# Patient Record
Sex: MALE | Race: WHITE
[De-identification: names, ages, dates, MRNs, and addresses within clinical notes are randomized per-mention and may not be internally consistent; named-entity substitution may affect disease eponyms.]

---

## 2018-08-28 ENCOUNTER — HOSPITAL ENCOUNTER (OUTPATIENT)
Age: 73
End: 2018-08-28
Payer: MEDICARE

## 2018-08-28 DIAGNOSIS — D70.9: Primary | ICD-10-CM

## 2018-08-28 LAB
ADD MANUAL DIFF / SLIDE REVIEW: NO
ALBUMIN SERPL-MCNC: 3.8 G/DL (ref 3.5–5)
ALBUMIN/GLOB SERPL: 1.5 {RATIO} (ref 1–2.8)
ALP SERPL-CCNC: 82 U/L (ref 38–126)
ALT SERPL-CCNC: 65 IU/L (ref 21–72)
BUN SERPL-MCNC: 16 MG/DL (ref 9–20)
CALCIUM SERPL-MCNC: 9.3 MG/DL (ref 8.4–10.2)
CHLORIDE SERPL-SCNC: 107 MMOL/L (ref 98–107)
CO2 SERPL-SCNC: 24 MMOL/L (ref 22–32)
ESTIMATED GLOMERULAR FILT RATE: > 60 ML/MIN (ref 60–?)
GLOBULIN SER CALC-MCNC: 2.6 G/DL (ref 1.7–4.1)
GLUCOSE SERPL-MCNC: 198 MG/DL (ref 80–110)
HEMATOCRIT: 39.8 % (ref 41–53)
HEMOGLOBIN: 13.8 G/DL (ref 13.5–17.5)
HEMOLYSIS: < 15 (ref 0–50)
MCV RBC: 88.2 FL (ref 80–100)
MEAN CORPUSCULAR HEMOGLOBIN: 30.6 PG (ref 26–34)
MEAN CORPUSCULAR HGB CONC: 34.6 % (ref 30–36)
PLATELET COUNT: 56 X10^3/UL (ref 150–400)
POTASSIUM SERPL-SCNC: 4.1 MMOL/L (ref 3.4–5.1)
PROT SERPL-MCNC: 6.4 G/DL (ref 6.3–8.2)
SODIUM SERPL-SCNC: 142 MMOL/L (ref 137–145)

## 2018-08-28 PROCEDURE — 36415 COLL VENOUS BLD VENIPUNCTURE: CPT

## 2018-08-28 PROCEDURE — 80053 COMPREHEN METABOLIC PANEL: CPT

## 2018-08-28 PROCEDURE — 85025 COMPLETE CBC W/AUTO DIFF WBC: CPT

## 2018-09-12 ENCOUNTER — HOSPITAL ENCOUNTER (OUTPATIENT)
Age: 73
End: 2018-09-12
Payer: MEDICARE

## 2018-09-12 DIAGNOSIS — D70.9: ICD-10-CM

## 2018-09-12 DIAGNOSIS — D69.6: ICD-10-CM

## 2018-09-12 DIAGNOSIS — R16.1: Primary | ICD-10-CM

## 2018-09-12 PROCEDURE — 76705 ECHO EXAM OF ABDOMEN: CPT

## 2018-09-12 NOTE — DI.US.S_ITS
PROCEDURE: US ABDOMEN LIMITED  
   
INDICATIONS:  Splenomegaly  
   
TECHNIQUE:    
Real-time focused scanning was performed of the abdomen, with image documentation.    
   
COMPARISON:  [facility], CT, NECK/CHEST/ABD/PEL W CONTRAST, 1/13/2016, 10:06.    
[facility], US, ABDOMEN LIMITED, 7/06/2016, 9:07.  
   
FINDINGS: The spleen is enlarged measuring 17.7 cm in greatest length, with additional   
dimensions of 8.2 and 17 cm, with a total calculated volume of 1296 cubic centimeters.    
In 2016, it measured 13.6 cm in length and had a calculated volume of 513 cc.  
   
   
   
IMPRESSION: Enlarged spleen, which has increased in size compared to 2016.    
   
Dictated by: Ravi Crespo M.D. on 9/12/2018 at 8:58       
Approved by: Ravi Crespo M.D. on 9/12/2018 at 9:01

## 2019-12-10 ENCOUNTER — HOSPITAL ENCOUNTER (OUTPATIENT)
Age: 74
End: 2019-12-10
Payer: MEDICARE

## 2019-12-10 DIAGNOSIS — R05: ICD-10-CM

## 2019-12-10 DIAGNOSIS — R18.8: ICD-10-CM

## 2019-12-10 DIAGNOSIS — J90: Primary | ICD-10-CM

## 2019-12-10 LAB
ADD MANUAL DIFF / SLIDE REVIEW: NO
ALBUMIN SERPL-MCNC: 3.5 G/DL (ref 3.5–5)
ALBUMIN/GLOB SERPL: 1.3 {RATIO} (ref 1–2.8)
ALP SERPL-CCNC: 139 U/L (ref 38–126)
ALT SERPL-CCNC: 30 IU/L (ref ?–50)
BUN SERPL-MCNC: 16 MG/DL (ref 9–20)
CALCIUM SERPL-MCNC: 8.7 MG/DL (ref 8.4–10.2)
CHLORIDE SERPL-SCNC: 104 MMOL/L (ref 98–107)
CO2 SERPL-SCNC: 27 MMOL/L (ref 22–32)
ESTIMATED GLOMERULAR FILT RATE: > 60 ML/MIN (ref 60–?)
GLOBULIN SER CALC-MCNC: 2.8 G/DL (ref 1.7–4.1)
GLUCOSE SERPL-MCNC: 146 MG/DL (ref 80–110)
HEMATOCRIT: 39.4 % (ref 41–53)
HEMOGLOBIN: 13.4 G/DL (ref 13.5–17.5)
HEMOLYSIS: < 15 (ref 0–50)
LYMPHOCYTES # SPEC AUTO: 800 /UL (ref 1100–4500)
MCV RBC: 84.3 FL (ref 80–100)
MEAN CORPUSCULAR HEMOGLOBIN: 28.7 PG (ref 26–34)
MEAN CORPUSCULAR HGB CONC: 34 % (ref 30–36)
PLATELET COUNT: 82 X10^3/UL (ref 150–400)
POTASSIUM SERPL-SCNC: 3.6 MMOL/L (ref 3.4–5.1)
PROT SERPL-MCNC: 6.3 G/DL (ref 6.3–8.2)
SODIUM SERPL-SCNC: 138 MMOL/L (ref 137–145)

## 2019-12-10 PROCEDURE — 82248 BILIRUBIN DIRECT: CPT

## 2019-12-10 PROCEDURE — 85025 COMPLETE CBC W/AUTO DIFF WBC: CPT

## 2019-12-10 PROCEDURE — 36415 COLL VENOUS BLD VENIPUNCTURE: CPT

## 2019-12-10 PROCEDURE — 80053 COMPREHEN METABOLIC PANEL: CPT

## 2019-12-10 PROCEDURE — 71046 X-RAY EXAM CHEST 2 VIEWS: CPT

## 2019-12-10 NOTE — DI.RAD.S_ITS
PROCEDURE:  XR CHEST 2V  
   
INDICATIONS:  PLEURAL EFFUSION  
   
TECHNIQUE:  2 views of the chest were acquired.    
   
COMPARISON:  [facility], CT, ABDOMEN/PELVIS WITH CONTRAST, 4/18/2015, 15:36.  
   
FINDINGS:    
   
Surgical changes and devices:  None.    
   
Lungs and pleura: There are small bilateral pleural effusions and bibasilar compression   
atelectasis.  No pneumothorax.    
   
Mediastinum:  Mediastinal contours are normal.  Heart size is normal.    
   
Bones and chest wall:  No suspicious bony abnormalities.  Soft tissues appear   
unremarkable.    
   
IMPRESSION: Small bilateral pleural effusions and bibasilar compression atelectasis.  
   
   
   
Dictated by: Arabella Myrick M.D. on 12/10/2019 at 16:45       
Approved by: Arabella Myrick M.D. on 12/10/2019 at 16:46

## 2019-12-12 ENCOUNTER — HOSPITAL ENCOUNTER (OUTPATIENT)
Age: 74
End: 2019-12-12
Payer: MEDICARE

## 2019-12-12 ENCOUNTER — HOSPITAL ENCOUNTER (EMERGENCY)
Age: 74
Discharge: HOME | End: 2019-12-12
Payer: MEDICARE

## 2019-12-12 VITALS
SYSTOLIC BLOOD PRESSURE: 178 MMHG | RESPIRATION RATE: 15 BRPM | DIASTOLIC BLOOD PRESSURE: 87 MMHG | HEART RATE: 85 BPM | OXYGEN SATURATION: 96 % | TEMPERATURE: 97.7 F

## 2019-12-12 VITALS
SYSTOLIC BLOOD PRESSURE: 147 MMHG | RESPIRATION RATE: 16 BRPM | OXYGEN SATURATION: 94 % | DIASTOLIC BLOOD PRESSURE: 73 MMHG | HEART RATE: 73 BPM

## 2019-12-12 VITALS — BODY MASS INDEX: 26.7 KG/M2

## 2019-12-12 VITALS
RESPIRATION RATE: 19 BRPM | HEART RATE: 84 BPM | OXYGEN SATURATION: 95 % | DIASTOLIC BLOOD PRESSURE: 69 MMHG | SYSTOLIC BLOOD PRESSURE: 172 MMHG

## 2019-12-12 VITALS
RESPIRATION RATE: 16 BRPM | SYSTOLIC BLOOD PRESSURE: 163 MMHG | DIASTOLIC BLOOD PRESSURE: 76 MMHG | OXYGEN SATURATION: 94 % | HEART RATE: 71 BPM

## 2019-12-12 VITALS
OXYGEN SATURATION: 96 % | SYSTOLIC BLOOD PRESSURE: 172 MMHG | HEART RATE: 83 BPM | DIASTOLIC BLOOD PRESSURE: 64 MMHG | RESPIRATION RATE: 18 BRPM

## 2019-12-12 DIAGNOSIS — R18.8: Primary | ICD-10-CM

## 2019-12-12 DIAGNOSIS — R16.1: ICD-10-CM

## 2019-12-12 DIAGNOSIS — K74.60: ICD-10-CM

## 2019-12-12 DIAGNOSIS — K80.20: ICD-10-CM

## 2019-12-12 LAB
ADD MANUAL DIFF / SLIDE REVIEW: NO
ALBUMIN SERPL-MCNC: 3.7 G/DL (ref 3.5–5)
ALBUMIN/GLOB SERPL: 1.2 {RATIO} (ref 1–2.8)
ALP SERPL-CCNC: 144 U/L (ref 38–126)
ALT SERPL-CCNC: 32 IU/L (ref ?–50)
BODY FLUID CLOTTED?: (no result)
BODY FLUID TOT NUCLEATED CELLS: 578 /UL
BUN SERPL-MCNC: 20 MG/DL (ref 9–20)
CALCIUM SERPL-MCNC: 8.9 MG/DL (ref 8.4–10.2)
CHLORIDE SERPL-SCNC: 101 MMOL/L (ref 98–107)
CO2 SERPL-SCNC: 27 MMOL/L (ref 22–32)
EOSINOPHILS BODY FLUID: 0 %
ESTIMATED GLOMERULAR FILT RATE: > 60 ML/MIN (ref 60–?)
GLOBULIN SER CALC-MCNC: 3 G/DL (ref 1.7–4.1)
GLUCOSE SERPL-MCNC: 106 MG/DL (ref 80–110)
HEMATOCRIT: 40.9 % (ref 41–53)
HEMOGLOBIN: 13.9 G/DL (ref 13.5–17.5)
HEMOLYSIS: 34 (ref 0–50)
INR PPP: 1.3 (ref 0.9–1.3)
LIPASE SERPL-CCNC: 189 U/L (ref 23–300)
LYMPHOCYTES # SPEC AUTO: 800 /UL (ref 1100–4500)
MCV RBC: 84.1 FL (ref 80–100)
MEAN CORPUSCULAR HEMOGLOBIN: 28.6 PG (ref 26–34)
MEAN CORPUSCULAR HGB CONC: 34 % (ref 30–36)
MONONUCLEAR WBC BODY FLUID: 88 %
OTHER CELLS BODY FLUID: 4 %
PLATELET COUNT: 84 X10^3/UL (ref 150–400)
POLYNUCLEAR WBC BODY FLUID: 8 %
POTASSIUM SERPL-SCNC: 3.5 MMOL/L (ref 3.4–5.1)
PROT SERPL-MCNC: 6.7 G/DL (ref 6.3–8.2)
PROTHROMBIN TIME: 14.4 SECONDS (ref 10.1–12.7)
PTT PARTIAL THROMBOPLASTIN TIM: 32 SECONDS (ref 26.4–36.2)
SODIUM SERPL-SCNC: 135 MMOL/L (ref 137–145)

## 2019-12-12 PROCEDURE — 85025 COMPLETE CBC W/AUTO DIFF WBC: CPT

## 2019-12-12 PROCEDURE — 36415 COLL VENOUS BLD VENIPUNCTURE: CPT

## 2019-12-12 PROCEDURE — 49083 ABD PARACENTESIS W/IMAGING: CPT

## 2019-12-12 PROCEDURE — 74177 CT ABD & PELVIS W/CONTRAST: CPT

## 2019-12-12 PROCEDURE — 82945 GLUCOSE OTHER FLUID: CPT

## 2019-12-12 PROCEDURE — 86900 BLOOD TYPING SEROLOGIC ABO: CPT

## 2019-12-12 PROCEDURE — 83690 ASSAY OF LIPASE: CPT

## 2019-12-12 PROCEDURE — 85610 PROTHROMBIN TIME: CPT

## 2019-12-12 PROCEDURE — 82150 ASSAY OF AMYLASE: CPT

## 2019-12-12 PROCEDURE — 83615 LACTATE (LD) (LDH) ENZYME: CPT

## 2019-12-12 PROCEDURE — 99284 EMERGENCY DEPT VISIT MOD MDM: CPT

## 2019-12-12 PROCEDURE — 86901 BLOOD TYPING SEROLOGIC RH(D): CPT

## 2019-12-12 PROCEDURE — 84157 ASSAY OF PROTEIN OTHER: CPT

## 2019-12-12 PROCEDURE — 89051 BODY FLUID CELL COUNT: CPT

## 2019-12-12 PROCEDURE — 85730 THROMBOPLASTIN TIME PARTIAL: CPT

## 2019-12-12 PROCEDURE — 80053 COMPREHEN METABOLIC PANEL: CPT

## 2019-12-12 PROCEDURE — 86850 RBC ANTIBODY SCREEN: CPT

## 2019-12-12 NOTE — DI.CT.S_ITS
PROCEDURE:  CT ABDOMEN PELVIS W CON  
   
INDICATIONS:  Other ascites  
   
TECHNIQUE:    
After the administration of oral and intravenous contrast, 5 mm thick sections acquired   
from the diaphragms to the symphysis.  5 mm thick coronal and sagittal reformats were   
performed.  For radiation dose reduction, the following was used:  automated exposure   
control, adjustment of mA and/or kV according to patient size.    
   
COMPARISON:  [facility], US, US ABDOMEN LIMITED, 9/12/2018, 9:36.  [facility],   
US, ABDOMEN LIMITED, 7/06/2016, 9:07.  [facility], CT, NECK/CHEST/ABD/PEL W   
CONTRAST, 1/13/2016, 10:06.  [facility], CT, ABDOMEN/PELVIS WITH CONTRAST,   
4/18/2015, 15:36.  
   
FINDINGS:    
Image quality:  Excellent.    
   
ABDOMEN:    
Lung bases: Moderate pleural effusions bilaterally.  Bibasilar compression atelectasis.   
Heart size is normal.  There is a small hiatal hernia.  
   
Solid organs:  Liver is small and nodular in contour consistent with cirrhosis.    
Gallbladder contains gallstones.  Biliary system is non-dilated.  Pancreas enhances   
normally.  Spleen is enlarged.  There is a 1.9 cm low density mass in spleen, which   
appears unchanged in size compared to 4/18/2015. Tiny low density nodules are noted in   
spleen, not definitely seen on the last exam. No adrenal nodules.  Kidneys are normal in   
size and enhancement, without hydronephrosis.    
   
Peritoneum and bowel:  There is a large amount of ascites. Jejunal loops and colon loops   
demonstrate increased wall thickness.  There are numerous colonic diverticula in   
descending and sigmoid colon. No CT findings to suggest acute diverticulitis. No free   
air.    
   
Nodes and vessels:  No retroperitoneal or mesenteric adenopathy.  Aorta and inferior vena   
cava are normal in caliber.  Recannulized umbilical vein consistent portal hypertension.  
   
Miscellaneous:  No ventral hernias.    
   
   
PELVIS:    
Genitourinary:  Bladder wall thickness is normal.  Enlarged prostate.  
   
Miscellaneous:  No inguinal hernias or adenopathy.    
   
Bones:  No suspicious bony lesions.  No vertebral body compression fractures.    
   
IMPRESSION:  
   
1. Cirrhosis.  
   
2. Splenomegaly which may be secondary to portal hypertension.  
   
3. Stable 1.9 cm mass in the spleen. Several small hypodense nodules in spleen are   
indeterminate.  
   
4. A large amount of ascites.  
   
5. There is diffuse jejunal and colonic wall thickening. Differential diagnoses include   
hypoalbuminemia versus infectious etiology.  
   
6. Diverticulosis without diverticulitis.   
   
7. Moderate bilateral pleural effusions and bibasilar compression atelectasis.  
   
   
8. Cholelithiasis.   
   
9. Enlarged prostate.  
   
After discussing with the patient, the patient was referred to ER. The result was   
discussed with Dr. Edwards in ER.  
   
Dictated by: Arabella Myrick M.D. on 12/12/2019 at 15:00       
Approved by: Arabella Myrick M.D. on 12/12/2019 at 15:24

## 2019-12-12 NOTE — ED.ABDPAIN
"HPI - Abdominal Pain
<Petrona Edwards DO - Last Filed: 12/12/19 19:01>
General
Chief Complaint: Abdominal Pain
Stated Complaint: sent post CT scan
Time Seen by Provider: 12/12/19 15:18
Source: patient
Mode of arrival: Ambulatory
Limitations: no limitations
History of Present Illness
HPI narrative: This is a 74-year-old male who comes to the emergency department with complaint of swelling in his abdomen.  Patient states when he lays flat he feels like there's lot of pressure and shortness of breath in his chest.  Patient states 
that if he is upright he does not have much symptoms.  Patient denies any fevers.  He states he usually doesn't feel nauseated he had a little bit this afternoon while getting a CT scan.  Patient states his abdomen has been increasing in size over 
time.  They state that they've noted that it's very hard.  He has had some loose stools for the past month.  He states that they're back to normal as of this week.  He denies any new urinary symptoms.  He gets mild swelling in his lower extremities. 
 Patient's states he was told he had fatty liver about 20 years ago.  He states that some his primary care ordered some lab work to evaluate increasing swelling in his belly.  And he is scheduled for an echo later in the month.  Patient states he 
has never had a blood transfusion, he denies IV drug abuse, no history of hepatitis he is aware of.  He denies any alcohol use except for occasional.
Related Data
Home Medications

 Medication  Instructions  Recorded  Confirmed
tamsulosin [Flomax] 0.4 mg PO QDAY #0 05/01/12 08/31/18
hydrochlorothiazide 6.26 mg PO QDAY #0 09/30/16 08/31/18
magnesium 200 mg PO DAILY #0 12/13/16 08/31/18


Allergies

Allergy/AdvReac Type Severity Reaction Status Date / Time
No Known Drug Allergies Allergy   Verified 12/12/19 15:15



Review of Systems
<Petrona Edwards DO - Last Filed: 12/12/19 19:01>
Review of Systems
ROS Unobtainable: All systems reviewed & are unremarkable except as noted in HPI and below

Patient History
<Petrona Edwards DO - Last Filed: 12/12/19 19:01>
Social History
Smoking Status:  Unknown if ever smoked 


Smoking Status: Unknown if ever smoked
alcohol intake frequency: other
Substance Use Type: does not use

Exam
<Petrona Edwards DO - Last Filed: 12/12/19 19:01>
Narrative
Exam Narrative: GENERAL: Alert and oriented x three, well-appearing male in mild distress.  Patient does appear much more comfortable when sitting upright.  
HEENT: Head normocephalic, atraumatic, EOMI, pupils reactive, face symmetric, moist mucous membranes
NECK: Supple, full range of motion
CARDIOVASCULAR: Regular rate and rhythm without murmurs, rubs or gallops.
RESPIRATORY: Breath sounds equal bilaterally, no wheezes rales or rhonchi.  Patient has mild cough on exam.
ABDOMEN: Soft, distended, abdomen feels tight but not hard.  No caput.  Normoactive bowel sounds all 4 quadrants.  No guarding or rebound, rigidity, no mass.  
: No CVA tenderness
EXTREMITIES: Normal range of motion, trace lower extremity edema.  Neurovascularly intact
NEUROLOGICAL: Cranial nerves II through XII grossly intact.  Moving all extremities
SKIN: Warm, dry, no petechiae, no rashes or lesions.
Initial Vital Signs
Initial Vital Signs:  Vital Signs

Temperature  97.7 F   12/12/19 15:15
Pulse Rate  85   12/12/19 15:15
Respiratory Rate  15   12/12/19 15:15
Blood Pressure  178/87 H  12/12/19 15:15
Pulse Oximetry  96   12/12/19 15:15



<Lily Villalpando PA-C - Last Filed: 12/12/19 17:27>
Initial Vital Signs
Initial Vital Signs:  Vital Signs

Temperature  97.7 F   12/12/19 15:15
Pulse Rate  85   12/12/19 15:15
Respiratory Rate  15   12/12/19 15:15
Blood Pressure  178/87 H  12/12/19 15:15
Pulse Oximetry  96   12/12/19 15:15



Course
<Petrona Edwards DO - Last Filed: 12/12/19 19:01>
Orders
Ordered:  ED Orders

12/12/19 15:18
Complete Blood Count AUTO DIFF Stat 
Comprehensive Metabolic Panel Stat 
Lipase Stat 
Partial Thromboplastin Time Sta
[id number]|2019-12-12 18:49:00|2019-12-12 18:49:00|ED_ITS|[facility]|Emergency Department|[id number]|"HPI - Nausea/Vomiting/Diarrhea

## 2019-12-12 NOTE — DI.US.S_ITS
PROCEDURE:  US PARACENTESIS  
   
INDICATIONS:  ascites, unknown cause. diagnostic and therapeutic  
   
TECHNIQUE:    
The indications, alternatives, benefits, risks, and complications of the procedure were   
explained to the patient.  Written informed consent was obtained and placed in the chart.   
 The abdomen and pelvis were examined sonographically, and an appropriate site was chosen   
for paracentesis.  The skin was prepared and draped in the usual sterile fashion, and 1%   
lidocaine was infiltrated from the skin down through the peritoneal surface.  A 19-gauge   
catheter-covered needle was then introduced into the peritoneal space, the catheter was   
advanced and the needle was withdrawn, and thereafter peritoneal fluid was withdrawn.    
The catheter was then removed and a dressing was applied.  The fluid was discarded if the   
clinician did not order diagnostic testing of the fluid.    
   
COMPARISON:  None.  
   
FINDINGS:    
Access site: Right lower quadrant  
Needle:  One-Step centesis catheter with introducer needle.    
Fluid volume and description:  5000 mL, slightly cloudy  
Fluid sent for diagnostic testing: Per Dr. Edwards  
Medications:  1% lidocaine for local anaesthesia.    
Complications:  None.    
   
IMPRESSION:  Successful ultrasound-guided paracentesis.    
   
   
   
Dictated by: Arabella Myrick M.D. on 12/12/2019 at 18:07       
Approved by: Arabella Myrick M.D. on 12/12/2019 at 18:07

## 2019-12-18 ENCOUNTER — HOSPITAL ENCOUNTER (EMERGENCY)
Age: 74
Discharge: HOME | End: 2019-12-18
Payer: MEDICARE

## 2019-12-18 VITALS
OXYGEN SATURATION: 98 % | SYSTOLIC BLOOD PRESSURE: 147 MMHG | HEART RATE: 73 BPM | RESPIRATION RATE: 17 BRPM | DIASTOLIC BLOOD PRESSURE: 64 MMHG

## 2019-12-18 VITALS
HEART RATE: 71 BPM | RESPIRATION RATE: 17 BRPM | DIASTOLIC BLOOD PRESSURE: 65 MMHG | OXYGEN SATURATION: 90 % | SYSTOLIC BLOOD PRESSURE: 134 MMHG

## 2019-12-18 VITALS
DIASTOLIC BLOOD PRESSURE: 76 MMHG | OXYGEN SATURATION: 91 % | SYSTOLIC BLOOD PRESSURE: 163 MMHG | HEART RATE: 73 BPM | RESPIRATION RATE: 16 BRPM

## 2019-12-18 VITALS
RESPIRATION RATE: 19 BRPM | OXYGEN SATURATION: 95 % | DIASTOLIC BLOOD PRESSURE: 71 MMHG | HEART RATE: 71 BPM | SYSTOLIC BLOOD PRESSURE: 156 MMHG

## 2019-12-18 VITALS
SYSTOLIC BLOOD PRESSURE: 160 MMHG | RESPIRATION RATE: 18 BRPM | HEART RATE: 75 BPM | DIASTOLIC BLOOD PRESSURE: 73 MMHG | OXYGEN SATURATION: 90 %

## 2019-12-18 VITALS
HEART RATE: 68 BPM | TEMPERATURE: 97.88 F | DIASTOLIC BLOOD PRESSURE: 72 MMHG | SYSTOLIC BLOOD PRESSURE: 142 MMHG | OXYGEN SATURATION: 94 % | RESPIRATION RATE: 20 BRPM

## 2019-12-18 VITALS — DIASTOLIC BLOOD PRESSURE: 70 MMHG | SYSTOLIC BLOOD PRESSURE: 142 MMHG | OXYGEN SATURATION: 91 % | HEART RATE: 70 BPM

## 2019-12-18 DIAGNOSIS — R03.0: ICD-10-CM

## 2019-12-18 DIAGNOSIS — K74.60: Primary | ICD-10-CM

## 2019-12-18 DIAGNOSIS — R18.8: ICD-10-CM

## 2019-12-18 PROCEDURE — 76705 ECHO EXAM OF ABDOMEN: CPT

## 2019-12-18 PROCEDURE — 85025 COMPLETE CBC W/AUTO DIFF WBC: CPT

## 2019-12-18 PROCEDURE — 99284 EMERGENCY DEPT VISIT MOD MDM: CPT

## 2019-12-18 PROCEDURE — 80053 COMPREHEN METABOLIC PANEL: CPT

## 2019-12-18 PROCEDURE — 36415 COLL VENOUS BLD VENIPUNCTURE: CPT

## 2019-12-18 PROCEDURE — 83735 ASSAY OF MAGNESIUM: CPT

## 2019-12-18 PROCEDURE — 83550 IRON BINDING TEST: CPT

## 2019-12-18 PROCEDURE — 99291 CRITICAL CARE FIRST HOUR: CPT

## 2019-12-18 PROCEDURE — 76942 ECHO GUIDE FOR BIOPSY: CPT

## 2019-12-18 PROCEDURE — 99215 OFFICE O/P EST HI 40 MIN: CPT

## 2019-12-18 PROCEDURE — 83540 ASSAY OF IRON: CPT

## 2019-12-18 PROCEDURE — 82728 ASSAY OF FERRITIN: CPT

## 2019-12-18 PROCEDURE — 93005 ELECTROCARDIOGRAM TRACING: CPT

## 2019-12-18 PROCEDURE — 93010 ELECTROCARDIOGRAM REPORT: CPT

## 2019-12-18 PROCEDURE — 82525 ASSAY OF COPPER: CPT

## 2019-12-18 PROCEDURE — 99283 EMERGENCY DEPT VISIT LOW MDM: CPT

## 2019-12-18 NOTE — ED_ITS
"HPI - SOB/Dyspnea    
General    
Chief Complaint: Shortness of Breath/Dyspnea    
Stated Complaint: difficulty breathing, sent by oncology    
Time Seen by Provider: 12/18/19 12:28    
Source: patient    
Mode of arrival: Ambulatory    
Limitations: no limitations    
History of Present Illness    
HPI Narrative: Patient comes emergency department complaining of increasing   
abdominal girth and difficulty breathing.  The patient was recently diagnosed   
with liver cirrhosis of unknown etiology.  Patient states he has never been a   
regular or heavy drinker and states that he has no history of hepatitis or   
chronic Tylenol use.  Patient states that he just had 5 L of ascitic fluid   
drained by paracentesis last week and that he feels as though he is feeling up   
again.  Patient went for his annual checkup with Dr. Mccartney for unrelated   
issues, and when he reported the symptoms he was having, Dr. Mccartney asked the   
patient to come to the emergency department for further evaluation.  Patient   
denies fever or abdominal pain, other than some discomfort associated with the   
increased abdominal girth.  No other complaints at this time.    
Related Data    
                                Home Medications    
    
    
    
 Medication  Instructions  Recorded  Confirmed    
     
tamsulosin [Flomax] 0.4 mg PO QDAY #0 05/01/12 12/18/19    
     
acetaminophen 325 mg PO BEDTIME 12/18/19 12/18/19    
    
    
                                  Previous Rx's    
    
    
    
 Medication  Instructions  Recorded    
     
furosemide [Lasix] 20 mg PO DAILY #30 tab 12/18/19    
     
spironolactone [Aldactone] 25 mg PO DAILY #30 tab 12/18/19    
    
    
    
                                    Allergies    
    
    
    
Allergy/AdvReac Type Severity Reaction Status Date / Time    
     
No Known Drug Allergies Allergy   Verified 12/12/19 15:15    
    
    
    
    
Review of Systems    
Constitutional    
Constitutional: Denies chills, Denies fatigue, Denies fever(s), Denies frequent   
falls, Denies lethargy and Denies weakness    
Eyes    
Eyes: Denies change in vision, Denies eye discharge, Denies irritation and   
Denies loss of vision    
ENT    
Ears, Nose, Mouth, and Throat: Denies change in voice, Denies dizziness, Denies   
neck pain, Denies sore throat and Denies throat swelling    
Cardiovascular    
Cardiovascular: Denies chest pain, Denies irregular heart rhythm, Denies   
lightheadedness, Denies palpitations, Denies dyspnea, Denies dyspnea on exertion  
 and Denies orthopnea    
Respiratory    
Respiratory: Denies cough, Denies dyspnea, Denies dyspnea on exertion and Denies  
 wheezing    
Gastrointestinal    
Gastrointestinal: Denies abdominal pain, Denies change in bowel habits, Denies   
diarrhea, Denies nausea and Denies vomiting    
Comments: Abdominal discomfort and increasing girth    
Genitourinary    
Genitourinary: Denies hematuria, Denies flank pain, Denies urinary incontinence   
and Denies urinary urgency    
Musculoskeletal    
Musculoskeletal: Denies back pain, Denies muscle weakness, Denies neck pain,   
Denies numbness and Denies tingling    
Integumentary/Breasts    
Skin/Breast: Denies pruritus, Denies erythema, Denies rash and Denies wounds    
Neurologic    
Neurologic: Denies behavioral changes, Denies confusion, Denies dizziness,   
Denies frequent falls, Denies loss of vision, Denies numbness, Denies tingling   
and Denies weakness    
Psychiatric    
Psychiatric: Denies anxiety, Denies behavioral changes, Denies confusion, Denies  
 depression, Denies homicidal ideation and Denies suicidal ideation    
Endocrine    
Endocrine: Denies fatigue, Denies flushing and Denies palpitations    
Hematologic/Lymphatic    
Hematologic/Lymphatic: Denies easy bruising    
Allergic/Immunologic    
Allergic/Immunologic: Denies urticaria, Denies throat swelling and Denies   
wheezing    
    
Patient
[id number]|2019-12-18 13:44:00|2019-12-18 14:55:00|DI.US.S_ITS|[facility]|Imaging|[id number]|"PROCEDURE: US ABDOMEN LIMITED

## 2019-12-18 NOTE — ED.SOB
"HPI - SOB/Dyspnea
General
Chief Complaint: Shortness of Breath/Dyspnea
Stated Complaint: difficulty breathing, sent by oncology
Time Seen by Provider: 12/18/19 12:28
Source: patient
Mode of arrival: Ambulatory
Limitations: no limitations
History of Present Illness
HPI Narrative: Patient comes emergency department complaining of increasing abdominal girth and difficulty breathing.  The patient was recently diagnosed with liver cirrhosis of unknown etiology.  Patient states he has never been a regular or heavy 
drinker and states that he has no history of hepatitis or chronic Tylenol use.  Patient states that he just had 5 L of ascitic fluid drained by paracentesis last week and that he feels as though he is feeling up again.  Patient went for his annual 
checkup with Dr. Mccartney for unrelated issues, and when he reported the symptoms he was having, Dr. Mccartney asked the patient to come to the emergency department for further evaluation.  Patient denies fever or abdominal pain, other than some 
discomfort associated with the increased abdominal girth.  No other complaints at this time.
Related Data
Home Medications

 Medication  Instructions  Recorded  Confirmed
tamsulosin [Flomax] 0.4 mg PO QDAY #0 05/01/12 12/18/19
acetaminophen 325 mg PO BEDTIME 12/18/19 12/18/19

Previous Rx's

 Medication  Instructions  Recorded
furosemide [Lasix] 20 mg PO DAILY #30 tab 12/18/19
spironolactone [Aldactone] 25 mg PO DAILY #30 tab 12/18/19


Allergies

Allergy/AdvReac Type Severity Reaction Status Date / Time
No Known Drug Allergies Allergy   Verified 12/12/19 15:15



Review of Systems
Constitutional
Constitutional: Denies chills, Denies fatigue, Denies fever(s), Denies frequent falls, Denies lethargy and Denies weakness
Eyes
Eyes: Denies change in vision, Denies eye discharge, Denies irritation and Denies loss of vision
ENT
Ears, Nose, Mouth, and Throat: Denies change in voice, Denies dizziness, Denies neck pain, Denies sore throat and Denies throat swelling
Cardiovascular
Cardiovascular: Denies chest pain, Denies irregular heart rhythm, Denies lightheadedness, Denies palpitations, Denies dyspnea, Denies dyspnea on exertion and Denies orthopnea
Respiratory
Respiratory: Denies cough, Denies dyspnea, Denies dyspnea on exertion and Denies wheezing
Gastrointestinal
Gastrointestinal: Denies abdominal pain, Denies change in bowel habits, Denies diarrhea, Denies nausea and Denies vomiting
Comments: Abdominal discomfort and increasing girth
Genitourinary
Genitourinary: Denies hematuria, Denies flank pain, Denies urinary incontinence and Denies urinary urgency
Musculoskeletal
Musculoskeletal: Denies back pain, Denies muscle weakness, Denies neck pain, Denies numbness and Denies tingling
Integumentary/Breasts
Skin/Breast: Denies pruritus, Denies erythema, Denies rash and Denies wounds
Neurologic
Neurologic: Denies behavioral changes, Denies confusion, Denies dizziness, Denies frequent falls, Denies loss of vision, Denies numbness, Denies tingling and Denies weakness
Psychiatric
Psychiatric: Denies anxiety, Denies behavioral changes, Denies confusion, Denies depression, Denies homicidal ideation and Denies suicidal ideation
Endocrine
Endocrine: Denies fatigue, Denies flushing and Denies palpitations
Hematologic/Lymphatic
Hematologic/Lymphatic: Denies easy bruising
Allergic/Immunologic
Allergic/Immunologic: Denies urticaria, Denies throat swelling and Denies wheezing

Patient History
Medical History (Reviewed 12/19/19 @ 19:39 by Trinidad Au MD)

Ascites (Acute)
Cirrhosis (Acute)
Gallstones (Acute)
Neutropenia (Acute)
Splenic mass (Acute)
Thrombocytopenia (Acute)


Social History (Reviewed 12/19/19 @ 19:39 by Trinidad Au MD)
Smoking Status:  Unknown if ever smoked 


Smoking Status: Unknown if ever smoked
alcohol intake frequency: other
Substance Use Type: does not use

Exam
Initial Vital Signs
Initial Vital Signs:  Vital Signs

Temperature  97.8 F   12/18/19 12:
[id number]|2019-12-18 16:39:04|2019-12-18 16:39:04|PC.NURSE||||"one on one with patient during paracentesis procedure"

## 2019-12-19 ENCOUNTER — HOSPITAL ENCOUNTER
Age: 74
End: 2019-12-19
Payer: MEDICARE

## 2019-12-19 DIAGNOSIS — K74.60: Primary | ICD-10-CM

## 2019-12-19 LAB — HCV AB SERPL QL IA: NEGATIVE S/C

## 2019-12-19 PROCEDURE — 86803 HEPATITIS C AB TEST: CPT

## 2019-12-19 PROCEDURE — 86706 HEP B SURFACE ANTIBODY: CPT

## 2019-12-27 ENCOUNTER — HOSPITAL ENCOUNTER (OUTPATIENT)
Age: 74
End: 2019-12-27
Payer: MEDICARE

## 2019-12-27 DIAGNOSIS — D69.6: ICD-10-CM

## 2019-12-27 DIAGNOSIS — R06.09: Primary | ICD-10-CM

## 2019-12-27 DIAGNOSIS — K74.60: ICD-10-CM

## 2019-12-27 DIAGNOSIS — R18.8: ICD-10-CM

## 2019-12-27 DIAGNOSIS — J90: ICD-10-CM

## 2019-12-27 PROCEDURE — 93306 TTE W/DOPPLER COMPLETE: CPT

## 2019-12-27 PROCEDURE — 76705 ECHO EXAM OF ABDOMEN: CPT

## 2019-12-27 NOTE — DI.US.S_ITS
PROCEDURE: US ABDOMEN LIMITED  
   
INDICATIONS:  ASCITES, CIRRHOSIS  
   
TECHNIQUE:    
Real-time focused scanning was performed of the abdomen, with image documentation.    
   
COMPARISON:  [facility], , US ABDOMEN LIMITED, 12/18/2019, 14:22.  
   
FINDINGS: Ultrasound examination of abdomen shows no large pockets of ascites fluid for   
paracentesis.  
   
IMPRESSION: No significant ascites fluid is seen. No paracentesis was performed.  
   
   
   
Dictated by: Saúl Elise M.D. on 12/27/2019 at 15:56       
Approved by: Saúl Elise M.D. on 12/27/2019 at 15:57

## 2019-12-27 NOTE — DI.ECHO.S_ITS
"                                    [facility]  
+---------+                        [facility]                        +---------+  
:         :                      [address]                     :         :  
:         :                      [place]                     :         :  
:         :                          [id number]                         :         :  
:         :                       Phone: [phone]                      :         :  
+---------+                        [phone]                        +---------+  
                             Echocardiogram Report  
+-----------------------------------------------------------------------------+  
:Name: JOHNNY MALDONADO         Study Date: 2019          Height: 73 in  :  
:[facility] MRN #: [id number]                                    Weight: 193 lb :  
:Account #: [id number]         Gender: Male                    BSA: 2.1 m2    :  
:: 1945               Age: 74 yrs                     BP: 154/60 mmHg:  
:Reason For Study: Dyspnea                                                    :  
:                              Performed By: Betty Madera                  :  
:Referring: LAINE OAKES                                                      :  
+-----------------------------------------------------------------------------+  
Interpretation Summary  
1) Normal left ventricular thickness, size, wall motion, and systolic function  
(EF 60-65%).  
2) Normal right ventricular size and function.  
3) No significant valvular abnormalities.  
4) The right ventricular systolic pressure is estimated to be at least 25 mmHg  
based on an estimated right atrial pressure of 3 mm Hg.  
5) There are moderate sized bilateral pleural effusions.  
6) Hypertension is present during the study (/60mmHg).  
7) No prior Echo available for comparison.  
   
Dyspnea probably from bilateral pleural effusions.  
   
   
Procedure:   A two-dimensional transthoracic echocardiogram with color flow  
and Doppler was performed. The study quality was technically adequate. There  
is no prior echocardiogram noted for this patient. The patient was in normal  
sinus rhythm during the exam.  
Left Ventricle:   The left ventricle is normal in size, wall thickness, and  
systolic function without any focal wall motion abnormalities. The ejection  
fraction is estimated to be 60-65%. Diastolic parameters suggest a relaxation  
abnormality of the left ventricle, consistent with probable normal filling  
pressures.  
Right Ventricle:   The right ventricle grossly appears normal in size with  
probable normal systolic function.  
Atria:   The left atrial size is normal. Right atrial size is normal.  
Mitral Valve:   The mitral valve is normal in structure and function. There is  
no mitral regurgitation noted.  
Aortic Valve:   The aortic valve opens well. There is no aortic valve  
stenosis. No aortic regurgitation is present.  
Tricuspid Valve:   The tricuspid valve is normal in structure and function.  
There is a trace or physiologic amount of tricuspid regurgitation. The right  
ventricular systolic pressure is estimated to be at least 25 mmHg based on an  
estimated right atrial pressure of 3 mm Hg.  
Pulmonic Valve:   The pulmonic valve is not well visualized.  
Great Vessels:   The aortic root is borderline dilated. The dimensions of the  
ascending aorta are normal. The aortic arch could not be visualized. The IVC  
is of normal diameter and collapses greater than 50% with a sniff. This  
suggests a low right atrial pressure of 3 mm Hg.  
Pericardium/ Pleura   There is no pericardial effusion. There is a moderate  
right-sided pleural effusion. There is a moderate left-sided pleural effusion.  
   
   
MMode/2D Measurements & Calculations  
LVIDd: 4.3 cm                                   Ao root diam: 3.8 cm  
LVIDs: 1.9 cm                                   Aortic Jxn: 3.2 cm  
FS: 54.5 %                             
[id number]|2019 08:52:00|2019 19:48:00|DI.US.S_ITS|[facility]|Imaging|[id number]|"PROCEDURE:  US OB >= 14 WEEKS FETUS

## 2019-12-30 ENCOUNTER — HOSPITAL ENCOUNTER (OUTPATIENT)
Age: 74
End: 2019-12-30
Payer: MEDICARE

## 2019-12-30 DIAGNOSIS — J90: Primary | ICD-10-CM

## 2019-12-30 DIAGNOSIS — D69.6: ICD-10-CM

## 2019-12-30 DIAGNOSIS — K74.60: ICD-10-CM

## 2019-12-30 DIAGNOSIS — R18.8: ICD-10-CM

## 2019-12-30 LAB
ADD MANUAL DIFF / SLIDE REVIEW: NO
ALBUMIN SERPL-MCNC: 3.5 G/DL (ref 3.5–5)
ALBUMIN/GLOB SERPL: 1.3 {RATIO} (ref 1–2.8)
ALP SERPL-CCNC: 184 U/L (ref 38–126)
ALT SERPL-CCNC: 41 IU/L (ref ?–50)
BUN SERPL-MCNC: 26 MG/DL (ref 9–20)
CALCIUM SERPL-MCNC: 8.9 MG/DL (ref 8.4–10.2)
CHLORIDE SERPL-SCNC: 101 MMOL/L (ref 98–107)
CO2 SERPL-SCNC: 30 MMOL/L (ref 22–32)
ESTIMATED GLOMERULAR FILT RATE: > 60 ML/MIN (ref 60–?)
GLOBULIN SER CALC-MCNC: 2.8 G/DL (ref 1.7–4.1)
GLUCOSE SERPL-MCNC: 120 MG/DL (ref 80–110)
HEMATOCRIT: 41.5 % (ref 41–53)
HEMOGLOBIN: 13.8 G/DL (ref 13.5–17.5)
HEMOLYSIS: < 15 (ref 0–50)
LYMPHOCYTES # SPEC AUTO: 900 /UL (ref 1100–4500)
MCV RBC: 85.2 FL (ref 80–100)
MEAN CORPUSCULAR HEMOGLOBIN: 28.4 PG (ref 26–34)
MEAN CORPUSCULAR HGB CONC: 33.4 % (ref 30–36)
PLATELET COUNT: 86 X10^3/UL (ref 150–400)
POTASSIUM SERPL-SCNC: 4 MMOL/L (ref 3.4–5.1)
PROT SERPL-MCNC: 6.3 G/DL (ref 6.3–8.2)
SODIUM SERPL-SCNC: 138 MMOL/L (ref 137–145)

## 2019-12-30 PROCEDURE — 80053 COMPREHEN METABOLIC PANEL: CPT

## 2019-12-30 PROCEDURE — 32555 ASPIRATE PLEURA W/ IMAGING: CPT

## 2019-12-30 PROCEDURE — 71045 X-RAY EXAM CHEST 1 VIEW: CPT

## 2019-12-30 PROCEDURE — 85025 COMPLETE CBC W/AUTO DIFF WBC: CPT

## 2019-12-30 PROCEDURE — 36415 COLL VENOUS BLD VENIPUNCTURE: CPT

## 2019-12-30 PROCEDURE — 88182 CELL MARKER STUDY: CPT

## 2019-12-30 NOTE — DI.RAD.S_ITS
PROCEDURE:  XR CHEST 1V  
   
INDICATIONS:  POST THORO  
   
TECHNIQUE:  One view of the chest was acquired.    
   
COMPARISON:  [facility], CR, XR CHEST 2V, 12/10/2019, 12:26.  
   
FINDINGS:    
   
Surgical changes and devices:  None.    
   
Lungs and pleura: Moderate large right pleural effusion. Small left pleural effusion.   
There is adjacent atelectasis  
   
Bones and chest wall:  No suspicious bony lesions.  Overlying soft tissues appear   
unremarkable.    
   
IMPRESSION:  
   
No pneumothorax status post ultrasound-guided right thoracentesis  
   
Bilateral persistent pleural effusions, right greater than left  
   
   
   
   
   
Dictated by: Kvng Uribe M.D. on 12/30/2019 at 13:50       
Approved by: Kvng Uribe M.D. on 12/30/2019 at 16:43

## 2019-12-30 NOTE — DI.US.S_ITS
PROCEDURE:  US THORACENTESIS  
   
INDICATIONS:  PLEURAL EFFUSION  
   
TECHNIQUE:    
The indications, alternatives, benefits, risks, and complications of the procedure were   
explained to the patient.  Written informed consent was obtained and placed in the chart.   
 The chest was examined sonographically, and an appropriate site was chosen for   
thoracentesis.  The skin was prepared and draped in the usual sterile fashion, and 1%   
lidocaine was infiltrated from the skin down through the pleural surface.  A 19-gauge   
catheter-covered needle was then introduced into the pleural space, the catheter was   
advanced and the needle was withdrawn, and thereafter pleural fluid was aspirated.  The   
catheter was then removed and a dressing was applied.    
   
COMPARISON:  None.  
   
FINDINGS:    
Access site: Right hemithorax.    
Needle:  One-Step centesis catheter with introducer needle.    
Fluid volume and description: 1620 cc of serosanguineous fluid  
Fluid sent for diagnostic testing: As requested  
Medications:  1% lidocaine for local anaesthesia.    
Complications:  None; post-procedural chest radiograph is pending to assess for   
pneumothorax.    
   
IMPRESSION:  Successful ultrasound-guided thoracentesis.    
   
   
   
Dictated by: Kvng Uribe M.D. on 12/30/2019 at 16:49       
Approved by: Kvng Uribe M.D. on 12/30/2019 at 16:49

## 2019-12-30 NOTE — PATH_ITS
Note  
[facility] Accession Number: [id number]  
   TESTS               RESULT  FLAG  UNITS    REF RANGE  LAB  
------------------------------------------------------------  
   Clinician Provided Cytology Information  
   No. of containers..01 Other (Miscellaneous)  
                                                          01  
   PLEURAL FLUID  
DIAGNOSIS:                                                02  
   PLEURAL FLUID  
   MESOTHELIAL CELLS AND A BACKGROUND OF SMALL, MATURE LYMPHOCYTES.  
   NEGATIVE FOR CYTOLOGICALLY MALIGNANT CELLS.  
   CONSIDER FLOW CYTOMETRIC STUDIES TO EXCLUDE A LYMPHOPROLIFERATIVE  
   PROCESS, IF CLINICAL AND IMAGING STUDIES ARE CONCORDANT OR IF FLUID  
   RE-ACCUMULATES.  
Pathologist ICD10:                                        02  
   J90  
                                                          02  
   Trinidad Gonzales MD, Pathologist  
   NPI- [id number]  
                                                          01  
   Baldemar Coates, Cytotechnologist ([facility])  
                                                          01  
   60 CC, ORANGE, CLEAR  
   /LCS  12/31/2019  1146 Local  
  
------------------------------------------------------------  
    FLAG LEGEND:  
    L-Low Normal,H-High Normal,LL-Alert Low,HH-Alert High  
    <-Panic Low,>-Panic High,A-Abnormal,AA-Critical Abnormal  
------------------------------------------------------------  
  
Performed at:  
01 =Z    [facility]  
   [address], [place]  [address]  
   Daniel L Toweill MD, Phone: [phone]  
02 LWA [facility]  
   [address] [address] [place] [address]  [address]  
   Willow Coleman MD, Phone: [phone]  
***Performed at:  01  
   [facility]  
   [address], [place], [place]  [id number]  
   MD Daniel Toweill MD Phone:  [phone]

## 2019-12-31 ENCOUNTER — HOSPITAL ENCOUNTER (OUTPATIENT)
Age: 74
End: 2019-12-31
Payer: MEDICARE

## 2019-12-31 DIAGNOSIS — R05: Primary | ICD-10-CM

## 2019-12-31 DIAGNOSIS — J90: ICD-10-CM

## 2019-12-31 DIAGNOSIS — J98.4: ICD-10-CM

## 2019-12-31 DIAGNOSIS — D73.9: ICD-10-CM

## 2019-12-31 DIAGNOSIS — J98.11: ICD-10-CM

## 2019-12-31 DIAGNOSIS — E04.1: ICD-10-CM

## 2019-12-31 DIAGNOSIS — K80.20: ICD-10-CM

## 2019-12-31 PROCEDURE — 71260 CT THORAX DX C+: CPT

## 2019-12-31 NOTE — DI.CT.S_ITS
PROCEDURE:  CT CHEST W CON  
   
INDICATIONS:  Cough  
   
TECHNIQUE:    
After the administration of intravenous contrast, 5 mm thick sections acquired from the   
pulmonary apices to the posterior costophrenic angles.  1 mm axial lung, 5 mm thick   
coronal and sagittal reformats and 7 mm axial MIP were acquired.  For radiation dose   
reduction, the following was used:  automated exposure control, adjustment of mA and/or   
kV according to patient size.    
   
COMPARISON:  [facility], CT, ABDOMEN/PELVIS WITH CONTRAST, 4/18/2015, 15:36.  [facility], US, US PARACENTESIS, 12/12/2019, 16:44.  [facility], CT, CT ABDOMEN PELVIS   
W CON, 12/12/2019, 14:36.  [facility], CR, XR CHEST 2V, 12/10/2019, 12:26.  [facility], US, US THORACENTESIS, 12/30/2019, 13:02.  [facility], CR, XR CHEST 1V,   
12/30/2019, 13:38.  
   
FINDINGS:    
Image quality:  Excellent.    
   
Lungs and pleura:  There is a large right pleural effusion and small to moderate left   
pleural effusion. Bibasilar atelectasis. There are lingular scars. No acute air space   
opacities.  No pneumothorax.  Central and peripheral airways are patent and normal in   
caliber.    
   
Mediastinum:  Heart size is normal.  No pericardial effusion.  No mediastinal or hilar   
adenopathy by size criteria.  A calcified lymph node in the right hilum. Thoracic aorta   
and central pulmonary arteries are normal in size.  Esophagus is normal in caliber.  No   
hiatal hernia.    
   
Bones and chest wall:  No suspicious bony lesions.  No vertebral body compression   
fractures.  No axillary or supraclavicular adenopathy by size criteria.  Thyroid gland   
contains a partially calcified nodule in the left lower lobe measure 1.8 x 2.9 cm.    
   
Abdomen: There are gallstones. The liver demonstrates nodular contour suggesting   
cirrhosis. There is moderate splenomegaly likely secondary to portal hypertension. A   
fetal of low-density nodules are noted in spleen measuring up to 2 cm. there is ascites.    
There is thickening of duodenum and jejunum.  
   
IMPRESSION:  
   
1. Large right pleural effusion and small-to-moderate left pleural effusion. There are   
bibasilar partial atelectasis.  
   
2. Lingular scars.  
   
   
3. The partially calcified left thyroid nodule. Recommend thyroid ultrasound followup.  
   
4. Cholelithiasis.  
   
5. Thickening of duodenum and proximal jejunum, which may be secondary to   
hypoalbuminemia, infection, or inflammatory process. Recommend clinical correlation.  
   
6. Low density nodules in spleen, indeterminate.  
   
   
Dictated by: Arabella Myrick M.D. on 12/31/2019 at 15:12       
Approved by: Arabella Myrick M.D. on 12/31/2019 at 18:13

## 2020-01-02 ENCOUNTER — HOSPITAL ENCOUNTER (OUTPATIENT)
Age: 75
End: 2020-01-02
Payer: MEDICARE

## 2020-01-02 DIAGNOSIS — R18.8: Primary | ICD-10-CM

## 2020-01-02 PROCEDURE — 49083 ABD PARACENTESIS W/IMAGING: CPT

## 2020-01-02 PROCEDURE — 88182 CELL MARKER STUDY: CPT

## 2020-01-02 NOTE — DI.US.S_ITS
PROCEDURE:  US PARACENTESIS  
   
INDICATIONS:  ASCITIES  
   
TECHNIQUE:    
The indications, alternatives, benefits, risks, and complications of the procedure were   
explained to the patient.  Written informed consent was obtained and placed in the chart.   
 The abdomen and pelvis were examined sonographically, and an appropriate site was chosen   
for paracentesis.  The skin was prepared and draped in the usual sterile fashion, and 1%   
lidocaine was infiltrated from the skin down through the peritoneal surface.  A 19-gauge   
catheter-covered needle was then introduced into the peritoneal space, the catheter was   
advanced and the needle was withdrawn, and thereafter peritoneal fluid was withdrawn.    
The catheter was then removed and a dressing was applied.  The fluid was discarded if the   
clinician did not order diagnostic testing of the fluid.    
   
COMPARISON:  [facility], ,  PARACENTESIS, 12/12/2019, 16:44.  
   
FINDINGS:    
Access site: Right flank  
Needle:  One-Step centesis catheter with introducer needle.    
Fluid volume and description: 120 cc of serous fluid  
Fluid sent for diagnostic testing: As requested  
Medications:  1% lidocaine for local anaesthesia.    
Complications:  None.    
   
IMPRESSION:  Successful ultrasound-guided paracentesis.    
   
   
   
Dictated by: Kvng Uribe M.D. on 1/02/2020 at 15:37       
Approved by: Kvng Uribe M.D. on 1/02/2020 at 15:37

## 2020-01-02 NOTE — PATH_ITS
Note  
[facility] Accession Number: [id number]  
   TESTS               RESULT  FLAG  UNITS    REF RANGE  LAB  
------------------------------------------------------------  
   Clinician Provided Cytology Information  
   No. of containers..01 Body Fluid in a Sterile Container  
                                                          01  
   ASCITES  
DIAGNOSIS:                                                01  
   ASCITES  
   NEGATIVE FOR MALIGNANT CELLS.  
   COMMENT:  
   Including cell block evaluation.  
Pathologist ICD10:                                        01  
   R18.8  
                                                          01  
   Joan Jo MD, Pathologist  
   NPI- [id number]  
                                                          01  
   Baldemar Coates, Cytotechnologist ([facility])  
                                                          01  
   55 CC, YELLOW, CLEAR  
   /LCS  01/03/2020  1417 Local  
  
------------------------------------------------------------  
    FLAG LEGEND:  
    L-Low Normal,H-High Normal,LL-Alert Low,HH-Alert High  
    <-Panic Low,>-Panic High,A-Abnormal,AA-Critical Abnormal  
------------------------------------------------------------  
  
Performed at:  
01 =Z    [facility]  
   [address], [place]  [address]  
   Daniel L Toweill MD, Phone: [phone]  
***Performed at:  01  
   [facility]  
   [address], [place], [place]  [id number]  
   MD Daniel Toweill MD Phone:  [phone]

## 2020-01-08 ENCOUNTER — HOSPITAL ENCOUNTER
Age: 75
End: 2020-01-08
Payer: MEDICARE

## 2020-01-08 DIAGNOSIS — E04.1: Primary | ICD-10-CM

## 2020-01-08 LAB — TSH W/ REFLEX TO FT4: 2.34 UIU/ML (ref 0.47–4.68)

## 2020-01-08 PROCEDURE — 84443 ASSAY THYROID STIM HORMONE: CPT

## 2020-01-10 ENCOUNTER — HOSPITAL ENCOUNTER (OUTPATIENT)
Age: 75
End: 2020-01-10
Payer: MEDICARE

## 2020-01-10 DIAGNOSIS — E04.2: Primary | ICD-10-CM

## 2020-01-10 PROCEDURE — 76536 US EXAM OF HEAD AND NECK: CPT

## 2020-01-10 NOTE — DI.US.S_ITS
PROCEDURE:  US THYROID  
   
INDICATIONS:  THYROID NODULE  
   
TECHNIQUE:    
Real-time scanning was performed of the thyroid gland, with image documentation.    
   
COMPARISON:  None.  
   
FINDINGS:    
Right:  Thyroid lobe measures 5 x 2.2 x 2 cm, and is homogeneous in echotexture.    
Left:  Thyroid lobe measures 5.9 x 3.5 x 2.8 cm, and is homogenous in echotexture.    
Isthmus: 5 mm thick.    
   
Nodule number: #1  
Location: Upper pole right thyroid lobe  
Size: 0.8 x 0.7 x 0.6 cm.    
Composition: Spongiform  
Echogenicity: Hypoechoic  
Shape: Wider than tall  
Margins: Smooth  
Echogenic foci: None  
Total points: 2  
ACR TI-RADS category: Not suspicious  
   
Nodule number: #2  
Location: Midpole right thyroid lobe  
Size: 0.9 x 0.7 x 0.7 cm.    
Composition: Spongiform  
Echogenicity: Hypoechoic  
Shape: Wider than tall  
Margins: Smooth  
Echogenic foci: None  
Total points: 2  
ACR TI-RADS category: Not suspicious  
   
Nodule number: #3  
Location: Inferior pole of right thyroid lobe  
Size: 1.8 x 1.5 x 1.5 cm.    
Composition: Solid  
Echogenicity: Hypoechoic  
Shape: Wider than tall  
Margins: Ill-defined  
Echogenic foci: Macrocalcifications are seen  
Total points: 5  
ACR TI-RADS category: Moderately suspicious  
   
Nodule number: #4  
Location: Midpole left thyroid lobe  
Size: 4.1 x 3.3 x 2.5 cm.    
Composition: Spongiform  
Echogenicity: Hypoechoic  
Shape: Wider than tall  
Margins: Smooth  
Echogenic foci: Macrocalcifications are seen.  
Total points: 3  
ACR TI-RADS category: Mildly suspicious.  
   
IMPRESSION:  
1. 3 hypoechoic nodules seen in right thyroid lobe and one hypoechoic nodule seen in left   
thyroid lobe. The lower pole of right thyroid lobe nodule is moderately suspicious,   
sonographic followup is recommended. Findings aspiration of this nodule can also be done   
for more definitive diagnosis.  
2. Single hypoechoic nodule in left thyroid lobe is mildly suspicious. Followup   
ultrasound is recommended.  
   
ACR TI-RADS definitions and recommendations:    
TI-RADS 1 (benign): 0 points.  FNA not needed.   
TI-RADS 2 (not suspicious): 2 points.  FNA not needed.   
TI-RADS 3 (mildly suspicious): 3 points.   
* FNA if 2.5 cm or larger, follow up if 1.5 cm or larger (at 1, 3, and 5 years).   
TI-RADS 4 (moderately suspicious): 4-6 points.    
* FNA if 1.5 cm or larger, follow up if 1 cm or larger (at 1, 2, 3, and 5 years).    
TI-RADS 5 (highly suspicious): 7 points or more.    
* FNA if 1 cm or larger, follow up if 0.5 cm or larger (every year for 5 years).    
   
Dictated by: Saúl Elise M.D. on 1/10/2020 at 12:25       
Approved by: Saúl Elise M.D. on 1/10/2020 at 12:32

## 2020-01-13 ENCOUNTER — HOSPITAL ENCOUNTER (OUTPATIENT)
Age: 75
End: 2020-01-13
Payer: MEDICARE

## 2020-01-13 DIAGNOSIS — J90: Primary | ICD-10-CM

## 2020-01-13 PROCEDURE — 32555 ASPIRATE PLEURA W/ IMAGING: CPT

## 2020-01-13 PROCEDURE — 85610 PROTHROMBIN TIME: CPT

## 2020-01-13 PROCEDURE — 71045 X-RAY EXAM CHEST 1 VIEW: CPT

## 2020-01-13 NOTE — DI.RAD.S_ITS
PROCEDURE:  XR CHEST 1V  
   
INDICATIONS:  POST THORACENTESIS  
   
TECHNIQUE:  One view of the chest was acquired.    
   
COMPARISON:  [facility], CR, XR CHEST 1V, 12/30/2019, 13:38.  [facility], CR,   
XR CHEST 2V, 12/10/2019, 12:26.  
   
FINDINGS:    
   
Surgical changes and devices:  None.    
   
Lungs and pleura:  Lungs are improved after thoracentesis of the right, with a moderate   
right and small left residual pleural effusion.  No pleural effusions or pneumothorax.    
   
Mediastinum:  Mediastinal contours appear normal.  Heart size is normal.    
   
Bones and chest wall:  No suspicious bony lesions.  Overlying soft tissues appear   
unremarkable.    
   
IMPRESSION: No pneumothorax after right-sided thoracentesis earlier today.  Residual   
moderate right and small left effusion.  
   
   
   
Dictated by: Armando Easton M.D. on 1/13/2020 at 16:30       
Approved by: Armando Easton M.D. on 1/13/2020 at 16:35

## 2020-01-13 NOTE — DI.US.S_ITS
PROCEDURE:  US THORACENTESIS  
   
INDICATIONS:  PLEURAL EFFUSION  
   
TECHNIQUE:    
The indications, alternatives, benefits, risks, and complications of the procedure were   
explained to the patient.  Written informed consent was obtained and placed in the chart.   
 The chest was examined sonographically, and an appropriate site was chosen for   
thoracentesis.  The skin was prepared and draped in the usual sterile fashion, and 1%   
lidocaine was infiltrated from the skin down through the pleural surface.  A 19-gauge   
catheter-covered needle was then introduced into the pleural space, the catheter was   
advanced and the needle was withdrawn, and thereafter pleural fluid was aspirated.  The   
catheter was then removed and a dressing was applied.    
   
COMPARISON:  [facility],  THORACENTESIS, 12/30/2019, 13:02.  
   
FINDINGS:    
Access site: Right hemithorax.    
Needle:  One-Step centesis catheter with introducer needle.    
Fluid volume and description: 3000 cc of clear serous fluid.  
Fluid sent for diagnostic testing: At the request of the ordering healthcare provider.  
Medications:  1% lidocaine for local anaesthesia.    
Complications:  None; post-procedural chest radiograph is pending to assess for   
pneumothorax.    
   
IMPRESSION:  Successful ultrasound-guided thoracentesis.    
   
   
   
Dictated by: Armando Easton M.D. on 1/14/2020 at 9:24       
Approved by: Armando Easton M.D. on 1/14/2020 at 9:25

## 2020-01-16 LAB — MISCELLANEOUS TO LABCORP: (no result)

## 2020-06-26 ENCOUNTER — HOSPITAL ENCOUNTER (INPATIENT)
Dept: HOSPITAL 73 - ED | Age: 75
LOS: 2 days | Discharge: HOME HEALTH | DRG: 698 | End: 2020-06-28
Payer: MEDICARE

## 2020-06-26 VITALS
RESPIRATION RATE: 15 BRPM | HEART RATE: 64 BPM | SYSTOLIC BLOOD PRESSURE: 157 MMHG | DIASTOLIC BLOOD PRESSURE: 70 MMHG | OXYGEN SATURATION: 96 %

## 2020-06-26 VITALS
OXYGEN SATURATION: 93 % | HEART RATE: 63 BPM | RESPIRATION RATE: 14 BRPM | DIASTOLIC BLOOD PRESSURE: 68 MMHG | SYSTOLIC BLOOD PRESSURE: 153 MMHG

## 2020-06-26 VITALS
RESPIRATION RATE: 14 BRPM | DIASTOLIC BLOOD PRESSURE: 65 MMHG | SYSTOLIC BLOOD PRESSURE: 142 MMHG | OXYGEN SATURATION: 95 % | HEART RATE: 65 BPM

## 2020-06-26 VITALS
DIASTOLIC BLOOD PRESSURE: 61 MMHG | HEART RATE: 73 BPM | OXYGEN SATURATION: 96 % | TEMPERATURE: 96.26 F | RESPIRATION RATE: 18 BRPM | SYSTOLIC BLOOD PRESSURE: 147 MMHG

## 2020-06-26 VITALS — BODY MASS INDEX: 25.7 KG/M2

## 2020-06-26 VITALS
RESPIRATION RATE: 15 BRPM | OXYGEN SATURATION: 95 % | SYSTOLIC BLOOD PRESSURE: 142 MMHG | DIASTOLIC BLOOD PRESSURE: 65 MMHG | HEART RATE: 68 BPM

## 2020-06-26 VITALS
HEART RATE: 65 BPM | DIASTOLIC BLOOD PRESSURE: 68 MMHG | RESPIRATION RATE: 13 BRPM | OXYGEN SATURATION: 95 % | SYSTOLIC BLOOD PRESSURE: 138 MMHG

## 2020-06-26 VITALS
DIASTOLIC BLOOD PRESSURE: 67 MMHG | TEMPERATURE: 97.88 F | HEART RATE: 65 BPM | SYSTOLIC BLOOD PRESSURE: 153 MMHG | RESPIRATION RATE: 18 BRPM | OXYGEN SATURATION: 96 %

## 2020-06-26 VITALS
OXYGEN SATURATION: 95 % | SYSTOLIC BLOOD PRESSURE: 145 MMHG | RESPIRATION RATE: 15 BRPM | HEART RATE: 67 BPM | DIASTOLIC BLOOD PRESSURE: 64 MMHG

## 2020-06-26 VITALS — OXYGEN SATURATION: 97 %

## 2020-06-26 VITALS
DIASTOLIC BLOOD PRESSURE: 70 MMHG | HEART RATE: 62 BPM | RESPIRATION RATE: 14 BRPM | SYSTOLIC BLOOD PRESSURE: 149 MMHG | OXYGEN SATURATION: 92 %

## 2020-06-26 VITALS — RESPIRATION RATE: 13 BRPM | OXYGEN SATURATION: 95 % | HEART RATE: 61 BPM

## 2020-06-26 VITALS — HEART RATE: 62 BPM | RESPIRATION RATE: 15 BRPM | OXYGEN SATURATION: 94 %

## 2020-06-26 VITALS — HEART RATE: 64 BPM | OXYGEN SATURATION: 94 % | RESPIRATION RATE: 15 BRPM

## 2020-06-26 DIAGNOSIS — K75.81: ICD-10-CM

## 2020-06-26 DIAGNOSIS — G93.41: ICD-10-CM

## 2020-06-26 DIAGNOSIS — K72.90: ICD-10-CM

## 2020-06-26 DIAGNOSIS — R33.9: ICD-10-CM

## 2020-06-26 DIAGNOSIS — B37.49: ICD-10-CM

## 2020-06-26 DIAGNOSIS — E11.65: ICD-10-CM

## 2020-06-26 DIAGNOSIS — D69.59: ICD-10-CM

## 2020-06-26 DIAGNOSIS — Z98.890: ICD-10-CM

## 2020-06-26 DIAGNOSIS — N40.1: ICD-10-CM

## 2020-06-26 DIAGNOSIS — T83.511A: Primary | ICD-10-CM

## 2020-06-26 DIAGNOSIS — E87.1: ICD-10-CM

## 2020-06-26 LAB
ADD MANUAL DIFF / SLIDE REVIEW: NO
ALBUMIN SERPL-MCNC: 3.9 G/DL (ref 3.5–5)
ALBUMIN/GLOB SERPL: 1.3 {RATIO} (ref 1–2.8)
ALP SERPL-CCNC: 103 U/L (ref 38–126)
ALT SERPL-CCNC: 37 IU/L (ref ?–50)
AMMONIA PLAS-SCNC: 115 UMOL/L (ref 9–30)
BUN SERPL-MCNC: 23 MG/DL (ref 9–20)
CALCIUM SERPL-MCNC: 9.9 MG/DL (ref 8.4–10.2)
CHLORIDE SERPL-SCNC: 99 MMOL/L (ref 98–107)
CK SERPL-CCNC: 39 U/L (ref 55–170)
CKMB % RELATIVE INDEX: (no result) % (ref 1.5–5)
CO2 SERPL-SCNC: 23 MMOL/L (ref 22–32)
CULTURE INDICATED URINE: (no result)
ESTIMATED GLOMERULAR FILT RATE: > 60 ML/MIN (ref 60–?)
GLOBULIN SER CALC-MCNC: 3 G/DL (ref 1.7–4.1)
GLUCOSE SERPL-MCNC: 415 MG/DL (ref 80–110)
HBA1C MFR BLD: 8.2 % (ref 4–6)
HEMATOCRIT: 38 % (ref 41–53)
HEMOGLOBIN: 13.3 G/DL (ref 13.5–17.5)
HEMOLYSIS: < 15 (ref 0–50)
INR PPP: 1.3 (ref 0.9–1.3)
LACTATE SERPL-MCNC: 1.8 MMOL/L (ref 0.7–2.1)
LIPASE SERPL-CCNC: 278 U/L (ref 23–300)
LYMPHOCYTES # SPEC AUTO: 1500 /UL (ref 1100–4500)
MCV RBC: 86.2 FL (ref 80–100)
MEAN CORPUSCULAR HEMOGLOBIN: 30.2 PG (ref 26–34)
MEAN CORPUSCULAR HGB CONC: 35 % (ref 30–36)
PLATELET COUNT: 95 X10^3/UL (ref 150–400)
POTASSIUM SERPL-SCNC: 4.8 MMOL/L (ref 3.4–5.1)
PROT SERPL-MCNC: 6.9 G/DL (ref 6.3–8.2)
PROTHROMBIN TIME: 14.9 SECONDS (ref 10.1–12.7)
PTT PARTIAL THROMBOPLASTIN TIM: 30 SECONDS (ref 26.4–36.2)
SODIUM SERPL-SCNC: 131 MMOL/L (ref 137–145)
TROPONIN I SERPL-MCNC: < 0.012 NG/ML (ref 0.01–0.03)

## 2020-06-26 PROCEDURE — 83605 ASSAY OF LACTIC ACID: CPT

## 2020-06-26 PROCEDURE — 87086 URINE CULTURE/COLONY COUNT: CPT

## 2020-06-26 PROCEDURE — 97116 GAIT TRAINING THERAPY: CPT

## 2020-06-26 PROCEDURE — 99284 EMERGENCY DEPT VISIT MOD MDM: CPT

## 2020-06-26 PROCEDURE — 87186 SC STD MICRODIL/AGAR DIL: CPT

## 2020-06-26 PROCEDURE — 81015 MICROSCOPIC EXAM OF URINE: CPT

## 2020-06-26 PROCEDURE — 80053 COMPREHEN METABOLIC PANEL: CPT

## 2020-06-26 PROCEDURE — 87077 CULTURE AEROBIC IDENTIFY: CPT

## 2020-06-26 PROCEDURE — 82140 ASSAY OF AMMONIA: CPT

## 2020-06-26 PROCEDURE — 84484 ASSAY OF TROPONIN QUANT: CPT

## 2020-06-26 PROCEDURE — 82550 ASSAY OF CK (CPK): CPT

## 2020-06-26 PROCEDURE — 85025 COMPLETE CBC W/AUTO DIFF WBC: CPT

## 2020-06-26 PROCEDURE — 83690 ASSAY OF LIPASE: CPT

## 2020-06-26 PROCEDURE — 83036 HEMOGLOBIN GLYCOSYLATED A1C: CPT

## 2020-06-26 PROCEDURE — 71045 X-RAY EXAM CHEST 1 VIEW: CPT

## 2020-06-26 PROCEDURE — 70450 CT HEAD/BRAIN W/O DYE: CPT

## 2020-06-26 PROCEDURE — 85730 THROMBOPLASTIN TIME PARTIAL: CPT

## 2020-06-26 PROCEDURE — 85610 PROTHROMBIN TIME: CPT

## 2020-06-26 PROCEDURE — 81003 URINALYSIS AUTO W/O SCOPE: CPT

## 2020-06-26 PROCEDURE — 87040 BLOOD CULTURE FOR BACTERIA: CPT

## 2020-06-26 PROCEDURE — 84145 PROCALCITONIN (PCT): CPT

## 2020-06-26 PROCEDURE — 82962 GLUCOSE BLOOD TEST: CPT

## 2020-06-26 PROCEDURE — 83735 ASSAY OF MAGNESIUM: CPT

## 2020-06-26 PROCEDURE — 93005 ELECTROCARDIOGRAM TRACING: CPT

## 2020-06-26 PROCEDURE — 36415 COLL VENOUS BLD VENIPUNCTURE: CPT

## 2020-06-26 PROCEDURE — 97162 PT EVAL MOD COMPLEX 30 MIN: CPT

## 2020-06-26 PROCEDURE — 80048 BASIC METABOLIC PNL TOTAL CA: CPT

## 2020-06-26 RX ADMIN — SODIUM CHLORIDE 75 ML: 0.9 INJECTION, SOLUTION INTRAVENOUS at 21:41

## 2020-06-26 RX ADMIN — LACTULOSE 20 GM: 20 SOLUTION ORAL at 21:40

## 2020-06-26 RX ADMIN — CEFTRIAXONE SODIUM 100 GM: 1 INJECTION, SOLUTION INTRAVENOUS at 21:42

## 2020-06-26 RX ADMIN — INSULIN ASPART 0 UNIT: 100 INJECTION, SOLUTION INTRAVENOUS; SUBCUTANEOUS at 22:12

## 2020-06-26 RX ADMIN — FUROSEMIDE 40 MG: 40 TABLET ORAL at 21:41

## 2020-06-26 RX ADMIN — SPIRONOLACTONE 50 MG: 25 TABLET, FILM COATED ORAL at 23:25

## 2020-06-26 NOTE — ED_ITS
"HPI - Weakness    
<MAINOR Palafox - Last Filed: 06/26/20 21:43>    
General    
Chief complaint: Weakness    
Stated complaint: dizziness, confusion, weakness/ pain with catheter    
Time Seen by Provider: 06/26/20 16:20    
Mode of arrival: Family Vehicle    
History of Present Illness    
HPI Narrative: 75-year-old male with a history of hepatic stenosis and recent   
TIPPS procedure on 06/08/2020 with  at  Medicine.  Presents to the   
emergency department with his wife for increasing weakness and confusion.  Wife   
states after the tips procedure he was feeling better, a week after the   
procedure he felt well enough to drive.  However, over the past week he has had   
increasing weakness and reports of dizziness.  Wife states she notices he is   
slower to answer questions.  Patient reports ?restless leg syndrome over my   
whole body ?and has difficulty sleeping at night.  He states he paces the floor   
at night when this happens and has noticed the past week he occasionally feels   
like he will pass out.  Patient states 2 days ago he fell in the shower as he   
felt weak and lost his balance.  Patient grabbed the shower curtain and lowered   
to the floor.  Wife states she heard the patient fall and observed in    
Related Data    
                                Home Medications    
    
    
    
 Medication  Instructions  Recorded  Confirmed    
     
tamsulosin [Flomax] 0.4 mg PO QDAY #0 05/01/12 06/26/20    
     
furosemide 40 mg PO BID 01/15/20 06/26/20    
     
spironolactone 50 mg PO BID 01/15/20 06/26/20    
     
lactulose 60 ml PO Q6HR 06/26/20 06/26/20    
    
    
    
                                    Allergies    
    
    
    
Allergy/AdvReac Type Severity Reaction Status Date / Time    
     
No Known Drug Allergies Allergy   Verified 06/26/20 15:54    
    
    
    
    
Patient History    
<MAINOR Palafox - Last Filed: 06/26/20 21:43>    
Medical History (Reviewed 06/26/20 @ 22:21 by MAINOR Meyer)    
    
Ascites (Inactive)    
Cirrhosis (Inactive)    
Gallstones (Inactive)    
Hepatic encephalopathy (Acute)    
MCCOY (nonalcoholic steatohepatitis) (Acute)    
Neutropenia (Resolved)    
Splenic mass (Inactive)    
Thrombocytopenia (Chronic)    
    
    
Surgical History (Reviewed 06/26/20 @ 22:21 by MAINOR Meyer)    
    
S/P TIPS (transjugular intrahepatic portosystemic shunt) (Acute)    
    
    
Family History (Updated 06/26/20 @ 22:22 by MAINOR Meyer)    
Mother   Pancreatic cancer    
Father   Colon cancer    
    
    
Social History (Reviewed 12/19/19 @ 19:39 by Trinidad Au MD)    
household members:  significant other     
Smoking Status:  Former smoker     
    
    
Smoking Status: Former smoker    
alcohol intake frequency: 0-2 drinks per day    
Substance Use Type: does not use    
    
Exam    
<MAINOR Palafox - Last Filed: 06/26/20 21:43>    
Initial Vital Signs    
Initial Vital Signs: Vital Signs    
    
    
    
Temperature  97.8 F   06/26/20 15:48    
     
Pulse Rate  65   06/26/20 15:48    
     
Respiratory Rate  18   06/26/20 15:48    
     
Blood Pressure  153/67 H  06/26/20 15:48    
     
Pulse Oximetry  96   06/26/20 15:48    
    
    
    
    
<Edie Tijerina MD - Last Filed: 06/27/20 12:39>    
Initial Vital Signs    
Initial Vital Signs: Vital Signs    
    
    
    
Temperature  97.8 F   06/26/20 15:48    
     
Pulse Rate  65   06/26/20 15:48    
     
Respiratory Rate  18   06/26/20 15:48    
     
Blood Pressure  153/67 H  06/26/20 15:48    
     
Pulse Oximetry  96   06/26/20 15:48    
    
    
    
    
Course    
<MAINOR Palafox - Last Filed: 06/26/20 21:43>    
Course    
Course Narrative: 1730: Spoke with  hepatologist Dr. Blackwell about   
patient's lab results.  She reported labs were completed at  on 6/18 which are  
 comparable to today's labs.  She reported 
[id number]|2020-06-27 11:42:57|2020-06-27 11:42:57|PC.NURSE||||"Pt alert and oriented , follows commands one person sba with fww. Pt concerned with martinez, catheter is appropriately placed and secure to leg. Presettled to bed. Wife continues attentive at bedside."

## 2020-06-26 NOTE — P.HP_ITS
"History of Present Illness    
History of Present Illness    
Date Patient Seen: 06/26/20    
Time Patient Seen: 20:00    
Chief complaint: dizziness, confusion, weakness/ pain with catheter    
Narrative:     
    
Calvin Meraz is a very pleasant 75-year-old male with a history of nonalcoholic   
fatty liver cirrhosis, status post TIPS on June 8, new diagnosis of diabetes   
type 2, recently diagnosed ileocecal valve polyps, acute urinary retention with   
an indwelling catheter presented with increased weakness.  He was discharged   
from [facility] after the tips on June 9th and followed up as   
recently as 2 days ago with providers in the [place] area and drove home.  He   
then began to have increasing in weakness, lethargy, his wife stated that he was  
confused, and decreasing bowel movements.  Upon discharge from the TIPS surgery,  
he was placed on lactulose and at that time was having 3-4 bowel movements a   
day.  When they decreased to twice a day his lactulose was increased with   
apparently no affect.  He has been having only 1 bowel movement a day for the   
last several days.  Also during the surgery and afterwards he developed urinary   
retention and was discharged with an indwelling catheter.  He does have a   
minimal amount of nausea when he was being transported to the imaging room, he   
has chronic restless legs that has improved since the TIPS surgery.  Denies   
fevers sweats or chills, difficulty swallowing, chest pain, shortness of breath,  
abdominal cramping or bloating or diarrhea.    
    
Patient is scheduled for resection of the ileocecal area in July and a prostate   
resection after he recovers from the colon surgery.    
    
Patient History    
Medical History (Reviewed 06/26/20 @ 22:21 by MAINOR Meyer)    
    
Ascites (Inactive)    
Cirrhosis (Inactive)    
Gallstones (Inactive)    
Hepatic encephalopathy (Acute)    
MCCOY (nonalcoholic steatohepatitis) (Acute)    
Neutropenia (Resolved)    
Splenic mass (Inactive)    
Thrombocytopenia (Chronic)    
    
    
Surgical History (Reviewed 06/26/20 @ 22:21 by MAINOR Meyer)    
    
S/P TIPS (transjugular intrahepatic portosystemic shunt) (Acute)    
    
    
Family & Social History    
Family History (Updated 06/26/20 @ 22:22 by MAINOR Meyer)    
Mother   Pancreatic cancer    
Father   Colon cancer    
    
    
Social History:                             
    
household members                significant other    
Prior Living Arrangements        House    
    
    
Safety & Behavioral:                        
    
Feels Safe in Current            Yes    
Environment                          
Been Physically Hurt or          No    
Threatened By a Person               
Suicidal Ideation Description    None    
Suicide Plan Description         No Plan    
    
    
Tobacco & Substance use:                    
    
Smoking Status                   Former smoker quit at age 25    
alcohol intake frequency         denies    
Substance Use Type               does not use    
    
    
    
Meds    
Home Medications and Allergies    
                                Home Medications    
    
    
    
 Medication  Instructions  Recorded  Confirmed  Type    
     
tamsulosin [Flomax] 0.4 mg PO QDAY #0 05/01/12 06/26/20 History    
     
furosemide 40 mg PO BID 01/15/20 06/26/20 History    
     
spironolactone 50 mg PO BID 01/15/20 06/26/20 History    
     
lactulose 60 ml PO Q6HR 06/26/20 06/26/20 History    
    
    
    
                                    Allergies    
    
    
    
Allergy/AdvReac Type Severity Reaction Status Date / Time    
     
No Known Drug Allergies Allergy   Verified 06/26/20 15:54    
    
    
    
    
Review of Systems    
Review of Systems    
ROS: Yes All systems reviewed with the patient and are negative except as   
otherwise documented    
    
Exam    
Vital Signs    
(pa
[id number]|2020-06-27 07:42:00|2020-06-27 07:42:00|P.PN_ITS|[facility]|Health Information Management|[id number]|"Subjective

## 2020-06-26 NOTE — PC.NURSE
Evening Shift/Admit Note- Patient arrived to room via stretcher from ER. Admission questions done, medications reviewed, and physical assessment completed. Oriented patient and spouse to room, lights, bathroom, phone, menu, and call bell/tv remote. 
Skin assessment done with night shift RN Helen. Safety measures in place. bed alarm activated. Call bell and phone within reach. will contiunue to monitor.

## 2020-06-26 NOTE — DI.CT.S_ITS
PROCEDURE:  CT HEAD/BRAIN WO CON  
   
INDICATIONS:  weakness  
   
TECHNIQUE:    
Noncontrast 4.5 mm thick angled axial sections acquired from the foramen magnum to the   
vertex, with coronal and sagittal reformats.  For radiation dose reduction, the following   
was used:  automated exposure control, adjustment of mA and/or kV according to patient   
size.    
   
COMPARISON:  [facility], CT, HEAD WITHOUT CONTRAST, 5/01/2012, 17:48.  
   
FINDINGS:    
Image quality:  Excellent.    
   
CSF spaces:  Basal cisterns are patent.  No extra-axial fluid collections.  The   
ventricles are symmetric in size and shape.    
   
Brain:  No intracranial bleeds or masses.  There is cerebral volume loss for age, with   
resultant ventricular and sulcal prominence.  There are periventricular and deep white   
matter chronic small vessel ischemic changes.  There is intracranial internal carotid   
artery atherosclerosis.    
   
Skull and face:  Calvarium and visualized facial bones appear intact, without suspicious   
lesions.    
   
Sinuses:  Visualized sinuses and mastoids are clear.    
   
IMPRESSION:  No acute intracranial process.  
   
   
   
   
   
Dictated by: Kvng Uribe M.D. on 6/26/2020 at 17:11       
Approved by: Kvng Uribe M.D. on 6/26/2020 at 17:12

## 2020-06-26 NOTE — PM.HP.1
"History of Present Illness
History of Present Illness
Date Patient Seen: 06/26/20
Time Patient Seen: 20:00
Chief complaint: dizziness, confusion, weakness/ pain with catheter
Narrative: 

Calvin Meraz is a very pleasant 75-year-old male with a history of nonalcoholic fatty liver cirrhosis, status post TIPS on June 8, new diagnosis of diabetes type 2, recently diagnosed ileocecal valve polyps, acute urinary retention with an 
indwelling catheter presented with increased weakness.  He was discharged from [facility] after the tips on June 9th and followed up as recently as 2 days ago with providers in the [place] area and drove home.  He then began to have 
increasing in weakness, lethargy, his wife stated that he was confused, and decreasing bowel movements.  Upon discharge from the TIPS surgery, he was placed on lactulose and at that time was having 3-4 bowel movements a day.  When they decreased to 
twice a day his lactulose was increased with apparently no affect.  He has been having only 1 bowel movement a day for the last several days.  Also during the surgery and afterwards he developed urinary retention and was discharged with an 
indwelling catheter.  He does have a minimal amount of nausea when he was being transported to the imaging room, he has chronic restless legs that has improved since the TIPS surgery.  Denies fevers sweats or chills, difficulty swallowing, chest 
pain, shortness of breath, abdominal cramping or bloating or diarrhea.

Patient is scheduled for resection of the ileocecal area in July and a prostate resection after he recovers from the colon surgery.

Patient History
Medical History (Reviewed 06/26/20 @ 22:21 by MAINOR Meyer)

Ascites (Inactive)
Cirrhosis (Inactive)
Gallstones (Inactive)
Hepatic encephalopathy (Acute)
MCCOY (nonalcoholic steatohepatitis) (Acute)
Neutropenia (Resolved)
Splenic mass (Inactive)
Thrombocytopenia (Chronic)


Surgical History (Reviewed 06/26/20 @ 22:21 by MAINOR Meyer)

S/P TIPS (transjugular intrahepatic portosystemic shunt) (Acute)


Family & Social History
Family History (Updated 06/26/20 @ 22:22 by MAINOR Meyer)
Mother
 Pancreatic cancer
Father
 Colon cancer


Social History:  

household members              significant other
Prior Living Arrangements      House


Safety & Behavioral:  

Feels Safe in Current          Yes
Environment                    
Been Physically Hurt or        No
Threatened By a Person         
Suicidal Ideation Description  None
Suicide Plan Description       No Plan


Tobacco & Substance use:  

Smoking Status                 Former smoker quit at age 25
alcohol intake frequency       denies
Substance Use Type             does not use



Meds
Home Medications and Allergies
Home Medications

 Medication  Instructions  Recorded  Confirmed  Type
tamsulosin [Flomax] 0.4 mg PO QDAY #0 05/01/12 06/26/20 History
furosemide 40 mg PO BID 01/15/20 06/26/20 History
spironolactone 50 mg PO BID 01/15/20 06/26/20 History
lactulose 60 ml PO Q6HR 06/26/20 06/26/20 History


Allergies

Allergy/AdvReac Type Severity Reaction Status Date / Time
No Known Drug Allergies Allergy   Verified 06/26/20 15:54



Review of Systems
Review of Systems
ROS: Yes All systems reviewed with the patient and are negative except as otherwise documented

Exam
Vital Signs
(past 8 hours):  -

 06/26/20
15:48 06/26/20
17:15 06/26/20
17:30
Temperature 97.8 F  
Pulse Rate 65 65 62
Respiratory Rate 18 13 14
Blood Pressure 153/67 H  
Blood Pressure [Left Arm]  138/68 149/70 H
Blood Pressure [Right Arm]   149/70 H
Pulse Oximetry 96 95 92

 06/26/20
17:45 06/26/20
18:00 06/26/20
18:15
Temperature   
Pulse Rate 62 64 61
Respiratory Rate 15 15 13
Blood Pressure   
Blood Pressure [Left Arm]  157/70 H 
Blood Pressure [Right Arm]  157/70 H 
Pulse Oximetry 94 96 95

 06/26/20
18:30 06/26/20
18:45 06/26/20
19:00
Temperature   
Pulse Rate 63 64 65
Respira
[id number]|2020-06-28 15:39:31|2020-06-28 15:39:31|DOREEN||||"Discharge Planning/Care Management

## 2020-06-26 NOTE — ED.WEAKNESS
HPI - Weakness
<MAINOR Palafox - Last Filed: 06/26/20 21:43>
General
Chief complaint: Weakness
Stated complaint: dizziness, confusion, weakness/ pain with catheter
Time Seen by Provider: 06/26/20 16:20
Mode of arrival: Family Vehicle
History of Present Illness
HPI Narrative: 75-year-old male with a history of hepatic stenosis and recent TIPPS procedure on 06/08/2020 with  at  Medicine.  Presents to the emergency department with his wife for increasing weakness and confusion.  Wife states after 
the tips procedure he was feeling better, a week after the procedure he felt well enough to drive.  However, over the past week he has had increasing weakness and reports of dizziness.  Wife states she notices he is slower to answer questions.  
Patient reports ?restless leg syndrome over my whole body ?and has difficulty sleeping at night.  He states he paces the floor at night when this happens and has noticed the past week he occasionally feels like he will pass out.  Patient states 2 
days ago he fell in the shower as he felt weak and lost his balance.  Patient grabbed the shower curtain and lowered to the floor.  Wife states she heard the patient fall and observed in
Related Data
Home Medications

 Medication  Instructions  Recorded  Confirmed
tamsulosin [Flomax] 0.4 mg PO QDAY #0 05/01/12 06/26/20
furosemide 40 mg PO BID 01/15/20 06/26/20
spironolactone 50 mg PO BID 01/15/20 06/26/20
lactulose 60 ml PO Q6HR 06/26/20 06/26/20


Allergies

Allergy/AdvReac Type Severity Reaction Status Date / Time
No Known Drug Allergies Allergy   Verified 06/26/20 15:54



Patient History
<MAINOR Palafox - Last Filed: 06/26/20 21:43>
Medical History (Reviewed 06/26/20 @ 22:21 by MAINOR Meyer)

Ascites (Inactive)
Cirrhosis (Inactive)
Gallstones (Inactive)
Hepatic encephalopathy (Acute)
MCCOY (nonalcoholic steatohepatitis) (Acute)
Neutropenia (Resolved)
Splenic mass (Inactive)
Thrombocytopenia (Chronic)


Surgical History (Reviewed 06/26/20 @ 22:21 by MAINOR Meyer)

S/P TIPS (transjugular intrahepatic portosystemic shunt) (Acute)


Family History (Updated 06/26/20 @ 22:22 by MAINOR Meyer)
Mother
 Pancreatic cancer
Father
 Colon cancer


Social History (Reviewed 12/19/19 @ 19:39 by Trinidad Au MD)
household members:  significant other 
Smoking Status:  Former smoker 


Smoking Status: Former smoker
alcohol intake frequency: 0-2 drinks per day
Substance Use Type: does not use

Exam
<MAINOR Palafox - Last Filed: 06/26/20 21:43>
Initial Vital Signs
Initial Vital Signs: Vital Signs

Temperature  97.8 F   06/26/20 15:48
Pulse Rate  65   06/26/20 15:48
Respiratory Rate  18   06/26/20 15:48
Blood Pressure  153/67 H  06/26/20 15:48
Pulse Oximetry  96   06/26/20 15:48



<Edie Tijerina MD - Last Filed: 06/27/20 12:39>
Initial Vital Signs
Initial Vital Signs: Vital Signs

Temperature  97.8 F   06/26/20 15:48
Pulse Rate  65   06/26/20 15:48
Respiratory Rate  18   06/26/20 15:48
Blood Pressure  153/67 H  06/26/20 15:48
Pulse Oximetry  96   06/26/20 15:48



Course
<MAINOR Palafox - Last Filed: 06/26/20 21:43>
Course
Course Narrative: 1730: Spoke with  hepatologist Dr. Blackwell about patient's lab results.  She reported labs were completed at  on 6/18 which are comparable to today's labs.  She reported at that time bilirubin: 2, Creatine 1.28.  Discussed 
ammonia levels and bilirubin are consistent, renal function is improving.  States patient is stable due to past reports. She recommended increasing lactulose from 4 times a day to every 4 hours (6 times a day) as patient is tolerating this well with 
1 bowel movement.  She also recommended treating UTI and admitting for observation.  

1809:  Spoke with Dr. George about patient's history, test, test results, and recent consultation with .  Discussed elevated ammonia verses UTI for increased weakness. He recommends transfer to  due to lack of GI resources at
[id number]|2020-06-27 14:42:54|2020-06-27 14:42:54|OT.IP.TRT||||

## 2020-06-27 VITALS
RESPIRATION RATE: 17 BRPM | OXYGEN SATURATION: 96 % | DIASTOLIC BLOOD PRESSURE: 61 MMHG | SYSTOLIC BLOOD PRESSURE: 144 MMHG | HEART RATE: 73 BPM

## 2020-06-27 VITALS
OXYGEN SATURATION: 96 % | SYSTOLIC BLOOD PRESSURE: 135 MMHG | RESPIRATION RATE: 16 BRPM | DIASTOLIC BLOOD PRESSURE: 63 MMHG | HEART RATE: 62 BPM | TEMPERATURE: 97.3 F

## 2020-06-27 VITALS
OXYGEN SATURATION: 96 % | RESPIRATION RATE: 18 BRPM | TEMPERATURE: 98.3 F | DIASTOLIC BLOOD PRESSURE: 60 MMHG | HEART RATE: 66 BPM | SYSTOLIC BLOOD PRESSURE: 147 MMHG

## 2020-06-27 VITALS
OXYGEN SATURATION: 97 % | RESPIRATION RATE: 16 BRPM | TEMPERATURE: 96.98 F | DIASTOLIC BLOOD PRESSURE: 58 MMHG | SYSTOLIC BLOOD PRESSURE: 135 MMHG | HEART RATE: 75 BPM

## 2020-06-27 VITALS
SYSTOLIC BLOOD PRESSURE: 126 MMHG | DIASTOLIC BLOOD PRESSURE: 45 MMHG | RESPIRATION RATE: 16 BRPM | OXYGEN SATURATION: 93 % | TEMPERATURE: 99.68 F | HEART RATE: 65 BPM

## 2020-06-27 VITALS
HEART RATE: 65 BPM | OXYGEN SATURATION: 97 % | SYSTOLIC BLOOD PRESSURE: 138 MMHG | DIASTOLIC BLOOD PRESSURE: 58 MMHG | TEMPERATURE: 97.88 F | RESPIRATION RATE: 16 BRPM

## 2020-06-27 VITALS — OXYGEN SATURATION: 95 %

## 2020-06-27 VITALS — OXYGEN SATURATION: 97 %

## 2020-06-27 LAB
ADD MANUAL DIFF / SLIDE REVIEW: NO
ALBUMIN SERPL-MCNC: 3.6 G/DL (ref 3.5–5)
ALBUMIN/GLOB SERPL: 1.2 {RATIO} (ref 1–2.8)
ALP SERPL-CCNC: 95 U/L (ref 38–126)
ALT SERPL-CCNC: 34 IU/L (ref ?–50)
AMMONIA PLAS-SCNC: 98 UMOL/L (ref 9–30)
BUN SERPL-MCNC: 23 MG/DL (ref 9–20)
CALCIUM SERPL-MCNC: 9.6 MG/DL (ref 8.4–10.2)
CHLORIDE SERPL-SCNC: 100 MMOL/L (ref 98–107)
CK SERPL-CCNC: 28 U/L (ref 55–170)
CO2 SERPL-SCNC: 26 MMOL/L (ref 22–32)
ESTIMATED GLOMERULAR FILT RATE: > 60 ML/MIN (ref 60–?)
GLOBULIN SER CALC-MCNC: 2.9 G/DL (ref 1.7–4.1)
GLUCOSE SERPL-MCNC: 351 MG/DL (ref 80–110)
HEMATOCRIT: 35.9 % (ref 41–53)
HEMOGLOBIN: 12.7 G/DL (ref 13.5–17.5)
HEMOLYSIS: < 15 (ref 0–50)
INR PPP: 1.3 (ref 0.9–1.3)
LYMPHOCYTES # SPEC AUTO: 1600 /UL (ref 1100–4500)
MCV RBC: 85 FL (ref 80–100)
MEAN CORPUSCULAR HEMOGLOBIN: 30.1 PG (ref 26–34)
MEAN CORPUSCULAR HGB CONC: 35.4 % (ref 30–36)
PLATELET COUNT: 88 X10^3/UL (ref 150–400)
POTASSIUM SERPL-SCNC: 4.2 MMOL/L (ref 3.4–5.1)
PROCALCITONIN: 0.05 NG/ML (ref ?–0.5)
PROT SERPL-MCNC: 6.5 G/DL (ref 6.3–8.2)
PROTHROMBIN TIME: 15.2 SECONDS (ref 10.1–12.7)
SODIUM SERPL-SCNC: 133 MMOL/L (ref 137–145)

## 2020-06-27 RX ADMIN — TAMSULOSIN HYDROCHLORIDE 0.4 MG: 0.4 CAPSULE ORAL at 10:34

## 2020-06-27 RX ADMIN — ONDANSETRON 4 MG: 2 INJECTION INTRAMUSCULAR; INTRAVENOUS at 14:09

## 2020-06-27 RX ADMIN — ONDANSETRON 4 MG: 2 INJECTION INTRAMUSCULAR; INTRAVENOUS at 17:35

## 2020-06-27 RX ADMIN — LACTULOSE 20 GM: 20 SOLUTION ORAL at 06:04

## 2020-06-27 RX ADMIN — FUROSEMIDE 40 MG: 40 TABLET ORAL at 10:24

## 2020-06-27 RX ADMIN — INSULIN ASPART 0 UNIT: 100 INJECTION, SOLUTION INTRAVENOUS; SUBCUTANEOUS at 02:03

## 2020-06-27 RX ADMIN — INSULIN ASPART 0 UNIT: 100 INJECTION, SOLUTION INTRAVENOUS; SUBCUTANEOUS at 14:09

## 2020-06-27 RX ADMIN — INSULIN ASPART 0 UNIT: 100 INJECTION, SOLUTION INTRAVENOUS; SUBCUTANEOUS at 10:38

## 2020-06-27 RX ADMIN — LACTULOSE 30 GM: 20 SOLUTION ORAL at 17:35

## 2020-06-27 RX ADMIN — LACTULOSE 30 GM: 20 SOLUTION ORAL at 14:09

## 2020-06-27 RX ADMIN — NYSTATIN AND TRIAMCINOLONE ACETONIDE 1 APPLIC: 100000; 1 CREAM TOPICAL at 21:19

## 2020-06-27 RX ADMIN — FUROSEMIDE 40 MG: 40 TABLET ORAL at 17:35

## 2020-06-27 RX ADMIN — SPIRONOLACTONE 50 MG: 50 TABLET ORAL at 10:34

## 2020-06-27 RX ADMIN — LACTULOSE 30 GM: 20 SOLUTION ORAL at 21:14

## 2020-06-27 RX ADMIN — INSULIN ASPART 0 UNIT: 100 INJECTION, SOLUTION INTRAVENOUS; SUBCUTANEOUS at 21:11

## 2020-06-27 RX ADMIN — INSULIN GLARGINE 10 UNIT: 100 INJECTION, SOLUTION SUBCUTANEOUS at 21:17

## 2020-06-27 RX ADMIN — SPIRONOLACTONE 50 MG: 50 TABLET ORAL at 17:35

## 2020-06-27 RX ADMIN — INSULIN ASPART 0 UNIT: 100 INJECTION, SOLUTION INTRAVENOUS; SUBCUTANEOUS at 17:05

## 2020-06-27 RX ADMIN — INSULIN HUMAN 5 UNIT: 100 INJECTION, SOLUTION PARENTERAL at 10:40

## 2020-06-27 RX ADMIN — INSULIN GLARGINE 10 UNIT: 100 INJECTION, SOLUTION SUBCUTANEOUS at 10:39

## 2020-06-27 RX ADMIN — LACTULOSE 20 GM: 20 SOLUTION ORAL at 00:10

## 2020-06-27 RX ADMIN — LACTULOSE 30 GM: 20 SOLUTION ORAL at 10:24

## 2020-06-27 RX ADMIN — CEFTRIAXONE SODIUM 100 GM: 2 INJECTION, SOLUTION INTRAVENOUS at 10:23

## 2020-06-27 NOTE — PC.NURSE
Fingerstick overnight was 468, pt asymptomatic. Reported to MAINOR Uribe who instructed to give another 7units regular insulin and have morning labs recheck it. 

AxOx3

No complaints of pain or nausea.

NS@100mL/hr

1P FWW to BR

## 2020-06-27 NOTE — PM.PN.1
Subjective
Subjective
Date Patient Seen: 06/27/20
Interval history: 




Calvin Meraz is a 75-year-old male with a past medical history significant for nonalcoholic cirrhosis status post recent TIPS and BPH with chronic urinary retention and indwelling Barr catheter who presented to the ED with increasing weakness and 
confusion.


The patient is sleeping in bed but arousable.  He is quite lethargic and has slowed mentation.  He does not appear overtly confused.  He complains of pain with Barr catheter at the tip of his penis and fatigue.  He has no other complaints and 
denies headache, cough, shortness of breath, chest pain, abdominal pain, nausea, vomiting, fever, chills or constipation.  He is voiding via Barr catheter and eliminating without difficulty.  He has had 2 bowel movements today and will titrate 
lactulose to 3+ bowel movements per day.  He is up ambulating with assistance.



Exam
Vital Signs
(past 8 hours):  -

 06/27/20
00:05 06/27/20
04:57
Temperature 96.9 F L 97.3 F L
Pulse Rate 75 62
Respiratory Rate 16 16
Blood Pressure 135/58 L 135/63
Pulse Oximetry 97 96



Oxygen Delivery Method         Room Air
Oxygen Flow Rate               0



Narrative
Exam Narrative: 

General:  Older male lying in bed and in no acute distress, well-developed, well-nourished, slowed mentation and lethargy but otherwise appropriately interactive
HEENT: Normocephalic, atraumatic. External ears without defect. Pupils equal, round, and reactive to light.  Sclera are slightly icteric. Moist conjunctivae and no lid lag.  Oropharynx free of erythema and cobble stoning with moist mucosa.
Neck: Supple with full range of motion. No lymphadenopathy or thyromegaly.  
Cardiovascular: Regular rate and rhythm without murmurs, rubs, or gallops appreciated
Pulmonary: Clear to auscultation bilaterally without crackles, wheezes, or rhonchi.  Normal respiratory effort with no use of accessory muscles.
Genitourinary:  Barr catheter in place with significant pain around glans penis and Barr insertion site.  Glans penis appears mildly irritated, slightly erythematous, and moist with probable yeast.
Abdomen:  Soft, bowel sounds present, mild tenderness to palpation over liver edge otherwise nontender, nondistended.  No fluid wave or abdominal ascites.  No hepatosplenomegaly or masses appreciated.
Extremities: No clubbing, cyanosis, or edema.
Skin: Normal temperature, turgor, and texture; no rash, ulcers, or subcutaneous nodules appreciated.
Neurological: Cranial nerves grossly intact.  Generalized weakness.  No asterixis.
Psychiatric:  Depressed mood and affect. Alert and oriented to person, place, and time.




Objective
Labs

Result Diagrams: 
06/27/20 05:20          

06/27/20 05:20          

Labs:  Laboratory Results - last 24 hr

  06/26/20 06/26/20 06/26/20
  16:00 16:00 16:00
WBC  7.2  
RBC  4.41 L  
Hgb  13.3 L  
Hct  38.0 L  
MCV  86.2  
MCH  30.2  
MCHC  35.0  
RDW  19.5 H  
Plt Count  95 L  
Neut % (Auto)  57.1  
Lymph % (Auto)  20.3 L  
Mono % (Auto)  19.4 H  
Eos % (Auto)  2.4  
Baso % (Auto)  0.8  
Neut # (Auto)  4100  
Lymph # (Auto)  1500  
Mono # (Auto)  1400 H  
Eos # (Auto)  200  
Baso # (Auto)  100  
PT   14.9 H 
INR   1.3 
APTT   30  D 
Sodium    131 L
Potassium    4.8
Chloride    99
Carbon Dioxide    23
BUN    23 H
Creatinine    1.14
Estimated GFR    > 60.0
BUN/Creatinine Ratio    20.2
Glucose    415 H
Hemoglobin A1c   
Lactate   
Calcium    9.9
Total Bilirubin    2.1 H
AST    43
ALT    37
Alkaline Phosphatase    103
Ammonia   
Total Creatine Kinase   
CK-MB (CK-2)   
CK-MB (CK-2) Rel Index   
Troponin I   
Total Protein    6.9
Albumin    3.9
Globulin    3.0
Albumin/Globulin Ratio    1.3
Lipase    278
Urine RBC   
Urine WBC   
Ur Squamous Epith Cells   
Amorphous Sediment   
Urine Bacteria   
Urine Mucus   
Ur Culture Indicated?   

  06/26/20 06/26/20 06/26/20
  16:00 16:00 16:00
WBC   
RBC   
Hgb 
[id number]|2020-06-28 14:04:17|2020-06-28 14:04:17|PT.IPTN||||

## 2020-06-27 NOTE — PC.NURSE
Pt has had uneventful evening. 
Lungs clear, SpO2 98% RA 
HL LAC intact/patent. 
CBG AC = 285, (5u coverage) HS = 233 
Lantus & Novolog coverage given 
Up ambulating hallway w/staff x 2 
Barr care/katie care given & ointment applied as per orders. 
Coude cath intact/patent cj urine. 
Call light w/in reach, bed alarm of for pt safety. 
Continue w/plan of care.

## 2020-06-28 VITALS
OXYGEN SATURATION: 97 % | RESPIRATION RATE: 16 BRPM | HEART RATE: 68 BPM | DIASTOLIC BLOOD PRESSURE: 58 MMHG | TEMPERATURE: 97.4 F | SYSTOLIC BLOOD PRESSURE: 140 MMHG

## 2020-06-28 VITALS
TEMPERATURE: 97.34 F | DIASTOLIC BLOOD PRESSURE: 48 MMHG | HEART RATE: 76 BPM | OXYGEN SATURATION: 96 % | RESPIRATION RATE: 16 BRPM | SYSTOLIC BLOOD PRESSURE: 125 MMHG

## 2020-06-28 LAB
ADD MANUAL DIFF / SLIDE REVIEW: NO
BUN SERPL-MCNC: 22 MG/DL (ref 9–20)
CALCIUM SERPL-MCNC: 9.3 MG/DL (ref 8.4–10.2)
CHLORIDE SERPL-SCNC: 104 MMOL/L (ref 98–107)
CO2 SERPL-SCNC: 20 MMOL/L (ref 22–32)
ESTIMATED GLOMERULAR FILT RATE: > 60 ML/MIN (ref 60–?)
GLUCOSE SERPL-MCNC: 207 MG/DL (ref 80–110)
HEMATOCRIT: 35.2 % (ref 41–53)
HEMOGLOBIN: 12.5 G/DL (ref 13.5–17.5)
HEMOLYSIS: < 15 (ref 0–50)
LYMPHOCYTES # SPEC AUTO: 1600 /UL (ref 1100–4500)
MAGNESIUM SERPL-MCNC: 2.2 MG/DL (ref 1.6–2.3)
MCV RBC: 84.9 FL (ref 80–100)
MEAN CORPUSCULAR HEMOGLOBIN: 30.2 PG (ref 26–34)
MEAN CORPUSCULAR HGB CONC: 35.6 % (ref 30–36)
PLATELET COUNT: 82 X10^3/UL (ref 150–400)
POTASSIUM SERPL-SCNC: 4 MMOL/L (ref 3.4–5.1)
PROCALCITONIN: < 0.05 NG/ML (ref ?–0.5)
SODIUM SERPL-SCNC: 132 MMOL/L (ref 137–145)

## 2020-06-28 RX ADMIN — CEFTRIAXONE SODIUM 100 GM: 2 INJECTION, SOLUTION INTRAVENOUS at 06:47

## 2020-06-28 RX ADMIN — SPIRONOLACTONE 50 MG: 50 TABLET ORAL at 10:12

## 2020-06-28 RX ADMIN — LACTULOSE 30 GM: 20 SOLUTION ORAL at 00:16

## 2020-06-28 RX ADMIN — INSULIN GLARGINE 10 UNIT: 100 INJECTION, SOLUTION SUBCUTANEOUS at 10:10

## 2020-06-28 RX ADMIN — LACTULOSE 30 GM: 20 SOLUTION ORAL at 05:54

## 2020-06-28 RX ADMIN — INSULIN ASPART 0 UNIT: 100 INJECTION, SOLUTION INTRAVENOUS; SUBCUTANEOUS at 14:14

## 2020-06-28 RX ADMIN — FUROSEMIDE 40 MG: 40 TABLET ORAL at 17:24

## 2020-06-28 RX ADMIN — FUROSEMIDE 40 MG: 40 TABLET ORAL at 09:30

## 2020-06-28 RX ADMIN — NYSTATIN AND TRIAMCINOLONE ACETONIDE 1 APPLIC: 100000; 1 CREAM TOPICAL at 10:12

## 2020-06-28 RX ADMIN — INSULIN ASPART 0 UNIT: 100 INJECTION, SOLUTION INTRAVENOUS; SUBCUTANEOUS at 10:11

## 2020-06-28 RX ADMIN — LACTULOSE 30 GM: 20 SOLUTION ORAL at 14:13

## 2020-06-28 RX ADMIN — INSULIN ASPART 0 UNIT: 100 INJECTION, SOLUTION INTRAVENOUS; SUBCUTANEOUS at 17:23

## 2020-06-28 RX ADMIN — SPIRONOLACTONE 50 MG: 50 TABLET ORAL at 17:24

## 2020-06-28 RX ADMIN — TAMSULOSIN HYDROCHLORIDE 0.4 MG: 0.4 CAPSULE ORAL at 10:12

## 2020-06-28 NOTE — PC.NURSE
Pt up frequently around change of shift dt increased stooling. Pt presently resting, holding off on b'fast. Wife asleep at bedside.

## 2020-06-28 NOTE — PM.DS.1
History of Present Illness
History of Present Illness
Date Patient Seen: 06/26/20
Chief complaint: dizziness, confusion, weakness/ pain with catheter
Narrative: 


Written by Tanya HAYWARD:

Calvin Meraz is a very pleasant 75-year-old male with a history of nonalcoholic fatty liver cirrhosis, status post TIPS on June 8, new diagnosis of diabetes type 2, recently diagnosed ileocecal valve polyps, acute urinary retention with an 
indwelling catheter presented with increased weakness.  He was discharged from [facility] after the tips on June 9th and followed up as recently as 2 days ago with providers in the [place] area and drove home.  He then began to have 
increasing in weakness, lethargy, his wife stated that he was confused, and decreasing bowel movements.  Upon discharge from the TIPS surgery, he was placed on lactulose and at that time was having 3-4 bowel movements a day.  When they decreased to 
twice a day his lactulose was increased with apparently no affect.  He has been having only 1 bowel movement a day for the last several days.  Also during the surgery and afterwards he developed urinary retention and was discharged with an 
indwelling catheter.  He does have a minimal amount of nausea when he was being transported to the imaging room, he has chronic restless legs that has improved since the TIPS surgery.  Denies fevers sweats or chills, difficulty swallowing, chest 
pain, shortness of breath, abdominal cramping or bloating or diarrhea.

Patient is scheduled for resection of the ileocecal area in July and a prostate resection after he recovers from the colon surgery.




Discharge Providers
Provider
Date of admission: 06/26/20 20:28

Discharge Date: 06/28/20
Primary care physician: Jannette Ordonez MD

Consults:  

06/27/20 07:56
Consult to Occupational Therapy Evaluate & Treat 
 Comment: 
 Physician Instructions: Evaluate and treat
Consult to Physical Therapy Evaluate & Treat 
 Comment: 
 Physician Instructions: Evaluate and Treat

06/28/20 12:27
Consult to Dietitian, Adult Routine 
 Comment: Please call dietician in for consult and education
 Reason For Exam: New diagnosis of Diabetes Mellitis

06/28/20 15:19
Consult to Home Health Routine 
 Comment: REFERRAL SENT TO [facility]
 Reason For Exam: HOME HEALTH RN AND PT AT D/C



Discharge provider: Marline Flores DO


Summary
Hospital Course
Discharge Diagnosis: 





1. Acute urinary tract infection, secondary to indwelling Barr catheter, present on admission.  Resolving.

2. Likely metabolic and hepatic encephalopathy, present on admission.  Resolved.

3. Newly diagnosed diabetes mellitus type 2, present on admission.  Active.

4. Non alcoholic cirrhosis, chronic, present on admission.  Stable.

5. BPH with recent urinary retention and indwelling Barr catheter and yeast infection of glans penis, chronic, present on admission.  Stable.



Hospital Course: 

Calvin Meraz is a 75-year-old male with a past medical history significant for nonalcoholic cirrhosis status post recent TIPS and BPH with chronic urinary retention and indwelling Barr catheter who presented to the ED with increasing weakness and 
confusion.


1. Acute urinary tract infection, secondary to indwelling Barr catheter, present on admission.  Resolving.
-Patient presented with increasing weakness and confusion.  Patient met sirs criteria but did not meet sepsis criteria as he had no end-organ dysfunction and SOFA score 0.
-Initial WBC normal at 7.2 and procalcitonin normal at 0.05.  Continued to monitor WBC and procalcitonin daily.
-Received ceftriaxone 2 g IV daily pending urine culture identification and sensitivities. Urine culture grew Enterococcus faecalis sensitive to fluoroquinolones and patient was discharged on levofloxacin 750 mg for 7 additional days to complete 10 
days total of antibiotic therapy.
-Continued physical and occupational therapy evaluation and treatment.

2. Likely metabo
[id number]|2020-06-27 10:48:00|2020-06-27 10:48:00|PT.IIE||||

## 2020-06-28 NOTE — PC.NURSE
Urine in martinez is pretty brown in color with several mucus-like particles in it.
Reports no pain
CBG overnight 216
Had 1 loose BM overnight and is trying to have another one now

## 2020-06-28 NOTE — PC.NURSE
Pt and spouse state he has all of his belongings and understands his discharge instructions and follow up appointments.  He is being driven home by his wife in a private car.  He is A and O x 4, denies pain and nausea.

## 2020-06-28 NOTE — P.DS_ITS
"History of Present Illness    
History of Present Illness    
Date Patient Seen: 06/26/20    
Chief complaint: dizziness, confusion, weakness/ pain with catheter    
Narrative:     
    
    
Written by Tanya HAYWARD:    
    
Calvin Meraz is a very pleasant 75-year-old male with a history of nonalcoholic   
fatty liver cirrhosis, status post TIPS on June 8, new diagnosis of diabetes   
type 2, recently diagnosed ileocecal valve polyps, acute urinary retention with   
an indwelling catheter presented with increased weakness.  He was discharged   
from [facility] after the tips on June 9th and followed up as   
recently as 2 days ago with providers in the [place] area and drove home.  He   
then began to have increasing in weakness, lethargy, his wife stated that he was  
confused, and decreasing bowel movements.  Upon discharge from the TIPS surgery,  
he was placed on lactulose and at that time was having 3-4 bowel movements a   
day.  When they decreased to twice a day his lactulose was increased with appare  
ntly no affect.  He has been having only 1 bowel movement a day for the last   
several days.  Also during the surgery and afterwards he developed urinary   
retention and was discharged with an indwelling catheter.  He does have a   
minimal amount of nausea when he was being transported to the imaging room, he   
has chronic restless legs that has improved since the TIPS surgery.  Denies   
fevers sweats or chills, difficulty swallowing, chest pain, shortness of breath,  
abdominal cramping or bloating or diarrhea.    
    
Patient is scheduled for resection of the ileocecal area in July and a prostate   
resection after he recovers from the colon surgery.    
    
    
    
    
Discharge Providers    
Provider    
Date of admission: 06/26/20 20:28    
    
Discharge Date: 06/28/20    
Primary care physician: Jannette Ordonez MD    
    
Consults:                                   
    
06/27/20 07:56    
Consult to Occupational Therapy Evaluate & Treat     
   Comment:     
   Physician Instructions: Evaluate and treat    
Consult to Physical Therapy Evaluate & Treat     
   Comment:     
   Physician Instructions: Evaluate and Treat    
    
06/28/20 12:27    
Consult to Dietitian, Adult Routine     
   Comment: Please call dietician in for consult and education    
   Reason For Exam: New diagnosis of Diabetes Mellitis    
    
06/28/20 15:19    
Consult to Home Health Routine     
   Comment: REFERRAL SENT TO [facility]    
   Reason For Exam: HOME HEALTH RN AND PT AT D/C    
    
    
    
Discharge provider: Marline Flores DO    
    
    
Summary    
Hospital Course    
Discharge Diagnosis:     
    
    
    
    
    
1. Acute urinary tract infection, secondary to indwelling Barr catheter,   
present on admission.  Resolving.    
    
2. Likely metabolic and hepatic encephalopathy, present on admission.  Resolved.    
    
3. Newly diagnosed diabetes mellitus type 2, present on admission.  Active.    
    
4. Non alcoholic cirrhosis, chronic, present on admission.  Stable.    
    
5. BPH with recent urinary retention and indwelling Barr catheter and yeast   
infection of glans penis, chronic, present on admission.  Stable.    
    
    
    
Hospital Course:     
    
Calvin Meraz is a 75-year-old male with a past medical history significant for   
nonalcoholic cirrhosis status post recent TIPS and BPH with chronic urinary   
retention and indwelling Barr catheter who presented to the ED with increasing   
weakness and confusion.    
    
    
1. Acute urinary tract infection, secondary to indwelling Barr catheter,   
present on admission.  Resolving.    
-Patient presented with increasing weakness and confusion.  Patient met sirs   
criteria but did not meet sepsis criteria as he had no end-organ dysfunction and  
SOFA score 0.    
-Initial WBC normal at 7.2 and procalcitonin normal at 0.05.
[id number]|2020-06-26 16:44:00|2020-06-26 17:15:00|DI.RAD.S_ITS|WONJ|Imaging|[id number]|"PROCEDURE:  XR CHEST 1V

## 2020-07-04 ENCOUNTER — HOSPITAL ENCOUNTER (INPATIENT)
Dept: HOSPITAL 73 - ED | Age: 75
LOS: 3 days | Discharge: HOME HEALTH | DRG: 442 | End: 2020-07-07
Payer: MEDICARE

## 2020-07-04 VITALS
HEART RATE: 88 BPM | OXYGEN SATURATION: 97 % | TEMPERATURE: 97.8 F | SYSTOLIC BLOOD PRESSURE: 170 MMHG | DIASTOLIC BLOOD PRESSURE: 71 MMHG | RESPIRATION RATE: 17 BRPM

## 2020-07-04 VITALS
OXYGEN SATURATION: 97 % | RESPIRATION RATE: 14 BRPM | HEART RATE: 62 BPM | DIASTOLIC BLOOD PRESSURE: 66 MMHG | SYSTOLIC BLOOD PRESSURE: 153 MMHG

## 2020-07-04 VITALS
RESPIRATION RATE: 15 BRPM | OXYGEN SATURATION: 98 % | DIASTOLIC BLOOD PRESSURE: 67 MMHG | SYSTOLIC BLOOD PRESSURE: 146 MMHG | HEART RATE: 62 BPM

## 2020-07-04 VITALS
RESPIRATION RATE: 13 BRPM | HEART RATE: 68 BPM | SYSTOLIC BLOOD PRESSURE: 149 MMHG | OXYGEN SATURATION: 97 % | DIASTOLIC BLOOD PRESSURE: 67 MMHG

## 2020-07-04 VITALS
OXYGEN SATURATION: 98 % | SYSTOLIC BLOOD PRESSURE: 118 MMHG | TEMPERATURE: 98.7 F | DIASTOLIC BLOOD PRESSURE: 68 MMHG | HEART RATE: 63 BPM | RESPIRATION RATE: 16 BRPM

## 2020-07-04 VITALS
HEART RATE: 70 BPM | SYSTOLIC BLOOD PRESSURE: 156 MMHG | OXYGEN SATURATION: 98 % | DIASTOLIC BLOOD PRESSURE: 67 MMHG | RESPIRATION RATE: 12 BRPM

## 2020-07-04 VITALS — OXYGEN SATURATION: 98 % | RESPIRATION RATE: 12 BRPM | HEART RATE: 62 BPM

## 2020-07-04 VITALS
HEART RATE: 63 BPM | DIASTOLIC BLOOD PRESSURE: 66 MMHG | RESPIRATION RATE: 14 BRPM | OXYGEN SATURATION: 98 % | SYSTOLIC BLOOD PRESSURE: 153 MMHG

## 2020-07-04 VITALS — HEART RATE: 64 BPM | OXYGEN SATURATION: 98 % | RESPIRATION RATE: 14 BRPM

## 2020-07-04 VITALS
OXYGEN SATURATION: 97 % | RESPIRATION RATE: 16 BRPM | SYSTOLIC BLOOD PRESSURE: 157 MMHG | DIASTOLIC BLOOD PRESSURE: 68 MMHG | HEART RATE: 76 BPM

## 2020-07-04 VITALS
DIASTOLIC BLOOD PRESSURE: 71 MMHG | OXYGEN SATURATION: 97 % | RESPIRATION RATE: 14 BRPM | SYSTOLIC BLOOD PRESSURE: 156 MMHG | HEART RATE: 66 BPM

## 2020-07-04 VITALS
RESPIRATION RATE: 12 BRPM | OXYGEN SATURATION: 98 % | DIASTOLIC BLOOD PRESSURE: 70 MMHG | HEART RATE: 66 BPM | SYSTOLIC BLOOD PRESSURE: 153 MMHG

## 2020-07-04 VITALS
HEART RATE: 63 BPM | SYSTOLIC BLOOD PRESSURE: 154 MMHG | OXYGEN SATURATION: 97 % | DIASTOLIC BLOOD PRESSURE: 66 MMHG | RESPIRATION RATE: 13 BRPM

## 2020-07-04 VITALS
SYSTOLIC BLOOD PRESSURE: 156 MMHG | RESPIRATION RATE: 12 BRPM | OXYGEN SATURATION: 98 % | DIASTOLIC BLOOD PRESSURE: 67 MMHG | HEART RATE: 73 BPM

## 2020-07-04 VITALS — BODY MASS INDEX: 25 KG/M2 | BODY MASS INDEX: 25.7 KG/M2

## 2020-07-04 DIAGNOSIS — T83.098A: ICD-10-CM

## 2020-07-04 DIAGNOSIS — Z87.891: ICD-10-CM

## 2020-07-04 DIAGNOSIS — B95.2: ICD-10-CM

## 2020-07-04 DIAGNOSIS — N40.1: ICD-10-CM

## 2020-07-04 DIAGNOSIS — K74.69: ICD-10-CM

## 2020-07-04 DIAGNOSIS — Z03.818: ICD-10-CM

## 2020-07-04 DIAGNOSIS — T83.511A: ICD-10-CM

## 2020-07-04 DIAGNOSIS — D69.59: ICD-10-CM

## 2020-07-04 DIAGNOSIS — N39.0: ICD-10-CM

## 2020-07-04 DIAGNOSIS — K72.00: Primary | ICD-10-CM

## 2020-07-04 DIAGNOSIS — Z79.84: ICD-10-CM

## 2020-07-04 DIAGNOSIS — R33.8: ICD-10-CM

## 2020-07-04 DIAGNOSIS — R07.89: ICD-10-CM

## 2020-07-04 DIAGNOSIS — E11.9: ICD-10-CM

## 2020-07-04 DIAGNOSIS — K76.6: ICD-10-CM

## 2020-07-04 LAB
ADD MANUAL DIFF / SLIDE REVIEW: NO
ALBUMIN SERPL-MCNC: 3.7 G/DL (ref 3.5–5)
ALBUMIN/GLOB SERPL: 1.2 {RATIO} (ref 1–2.8)
ALP SERPL-CCNC: 120 U/L (ref 38–126)
ALT SERPL-CCNC: 49 IU/L (ref ?–50)
AMMONIA PLAS-SCNC: 153 UMOL/L (ref 9–30)
BILIRUBIN URINE UA: NEGATIVE
BUN SERPL-MCNC: 24 MG/DL (ref 9–20)
CALCIUM SERPL-MCNC: 9.8 MG/DL (ref 8.4–10.2)
CHLORIDE SERPL-SCNC: 103 MMOL/L (ref 98–107)
CK SERPL-CCNC: 24 U/L (ref 55–170)
CKMB % RELATIVE INDEX: (no result) % (ref 1.5–5)
CO2 SERPL-SCNC: 21 MMOL/L (ref 22–32)
COLOR UR: YELLOW
ESTIMATED GLOMERULAR FILT RATE: 59.6 ML/MIN (ref 60–?)
GLOBULIN SER CALC-MCNC: 3 G/DL (ref 1.7–4.1)
GLUCOSE SERPL-MCNC: 205 MG/DL (ref 80–110)
GLUCOSE URINE UA: NEGATIVE G/DL
HEMATOCRIT: 36.3 % (ref 41–53)
HEMOGLOBIN: 12.7 G/DL (ref 13.5–17.5)
HEMOLYSIS: < 15 (ref 0–50)
HGB UR QL: (no result)
INR PPP: 1.3 (ref 0.9–1.3)
KETONES URINE UA: NEGATIVE
LEUKOCYTE ESTERASE URINE UA: NEGATIVE
LIPASE SERPL-CCNC: 395 U/L (ref 23–300)
LYMPHOCYTES # SPEC AUTO: 1200 /UL (ref 1100–4500)
MAGNESIUM SERPL-MCNC: 2.4 MG/DL (ref 1.6–2.3)
MCV RBC: 85.5 FL (ref 80–100)
MEAN CORPUSCULAR HEMOGLOBIN: 30 PG (ref 26–34)
MEAN CORPUSCULAR HGB CONC: 35.1 % (ref 30–36)
NITRITE URINE UA: NEGATIVE
PH UR: 7.5 [PH] (ref 4.5–8)
PLATELET COUNT: 71 X10^3/UL (ref 150–400)
POTASSIUM SERPL-SCNC: 4.1 MMOL/L (ref 3.4–5.1)
PROCALCITONIN: < 0.05 NG/ML (ref ?–0.5)
PROT SERPL-MCNC: 6.7 G/DL (ref 6.3–8.2)
PROTEIN URINE UA: NEGATIVE
PROTHROMBIN TIME: 15.5 SECONDS (ref 10.1–12.7)
PTT PARTIAL THROMBOPLASTIN TIM: 29 SECONDS (ref 26.4–36.2)
SODIUM SERPL-SCNC: 134 MMOL/L (ref 137–145)
SP GR UR: <=1.005 (ref 1–1.03)
TROPONIN I SERPL-MCNC: < 0.012 NG/ML (ref 0.01–0.03)
UROBILINOGEN UR QL: 0.2 E.U./DL

## 2020-07-04 PROCEDURE — 36415 COLL VENOUS BLD VENIPUNCTURE: CPT

## 2020-07-04 PROCEDURE — 82962 GLUCOSE BLOOD TEST: CPT

## 2020-07-04 PROCEDURE — 74177 CT ABD & PELVIS W/CONTRAST: CPT

## 2020-07-04 PROCEDURE — 81001 URINALYSIS AUTO W/SCOPE: CPT

## 2020-07-04 PROCEDURE — 97116 GAIT TRAINING THERAPY: CPT

## 2020-07-04 PROCEDURE — 97161 PT EVAL LOW COMPLEX 20 MIN: CPT

## 2020-07-04 PROCEDURE — 85025 COMPLETE CBC W/AUTO DIFF WBC: CPT

## 2020-07-04 PROCEDURE — 93005 ELECTROCARDIOGRAM TRACING: CPT

## 2020-07-04 PROCEDURE — 97165 OT EVAL LOW COMPLEX 30 MIN: CPT

## 2020-07-04 PROCEDURE — 87635 SARS-COV-2 COVID-19 AMP PRB: CPT

## 2020-07-04 PROCEDURE — 83735 ASSAY OF MAGNESIUM: CPT

## 2020-07-04 PROCEDURE — 80053 COMPREHEN METABOLIC PANEL: CPT

## 2020-07-04 PROCEDURE — 80076 HEPATIC FUNCTION PANEL: CPT

## 2020-07-04 PROCEDURE — 51798 US URINE CAPACITY MEASURE: CPT

## 2020-07-04 PROCEDURE — 96374 THER/PROPH/DIAG INJ IV PUSH: CPT

## 2020-07-04 PROCEDURE — 99284 EMERGENCY DEPT VISIT MOD MDM: CPT

## 2020-07-04 PROCEDURE — 83690 ASSAY OF LIPASE: CPT

## 2020-07-04 PROCEDURE — 84145 PROCALCITONIN (PCT): CPT

## 2020-07-04 PROCEDURE — 85610 PROTHROMBIN TIME: CPT

## 2020-07-04 PROCEDURE — 85730 THROMBOPLASTIN TIME PARTIAL: CPT

## 2020-07-04 PROCEDURE — 82140 ASSAY OF AMMONIA: CPT

## 2020-07-04 PROCEDURE — 97530 THERAPEUTIC ACTIVITIES: CPT

## 2020-07-04 PROCEDURE — 82550 ASSAY OF CK (CPK): CPT

## 2020-07-04 PROCEDURE — 71045 X-RAY EXAM CHEST 1 VIEW: CPT

## 2020-07-04 PROCEDURE — 84484 ASSAY OF TROPONIN QUANT: CPT

## 2020-07-04 PROCEDURE — 80048 BASIC METABOLIC PNL TOTAL CA: CPT

## 2020-07-04 PROCEDURE — 97535 SELF CARE MNGMENT TRAINING: CPT

## 2020-07-04 RX ADMIN — METOCLOPRAMIDE 10 MG: 5 INJECTION, SOLUTION INTRAMUSCULAR; INTRAVENOUS at 22:22

## 2020-07-04 RX ADMIN — SODIUM CHLORIDE 125 ML: 0.9 INJECTION, SOLUTION INTRAVENOUS at 20:43

## 2020-07-04 RX ADMIN — ROPINIROLE HYDROCHLORIDE 0.25 MG: 0.25 TABLET, FILM COATED ORAL at 23:44

## 2020-07-04 RX ADMIN — ONDANSETRON 4 MG: 2 INJECTION INTRAMUSCULAR; INTRAVENOUS at 20:43

## 2020-07-04 RX ADMIN — LACTULOSE 10 GM: 20 SOLUTION ORAL at 22:32

## 2020-07-04 RX ADMIN — LACTULOSE 20 GM: 20 SOLUTION ORAL at 21:58

## 2020-07-04 NOTE — PM.HP.1
"History of Present Illness
History of Present Illness
Date Patient Seen: 07/04/20
Time Patient Seen: 20:54
Chief complaint: lethargic,meds not working,confusion,dizzy
Narrative: Mr. Calvin Meraz is a 75-year-old  male male with a history of cryptogenic liver cirrhosis with ascites, status post TIPS (06/08/2020 at [facility]), splenic mass, cholelithiasis, non insulin-dependent diabetes type 2, 
urinary retention with indwelling catheter placed on 06/10/2020 on discharge from [facility] who presents to the emergency department with increasing weakness over several days and nausea.  The patient is somewhat poor historian and 
his wife is at bedside providing additional information.  The patient has been on lactulose therapy until today with decreased stooling.  He had 1 BM today that was normal and formed with his last bowel movement prior to that being 4 days ago.  
Presented with increased weakness and confusion that has been progressive for several days and has developed nausea and has been unable to take lactulose today.  He also reports complaints of intermittent dressed pressure that is nonpleuritic and 
nonradiating without associated shortness of breath or diaphoresis that he states feels similar to when he had pleural effusions previously.  The patient was admitted to [facility] under similar circumstances between June 26 and June 28 at 
which time he was diagnosed with urinary tract infection, and hepatic encephalopathy.  The patient was discharged on Levaquin that he has been taking with 2 doses remaining.  The patient denies complaints of fevers or chills her COVID-19 exposure.  
He has no complaints of headaches or dizziness and sustained no falls.  He reports no nasal congestion or sore throat.  He has chest pressure as described above which is intermittent and nonpleuritic.  He denies complaints of shortness of breath, 
cough or wheezing.  He does complain a suprapubic abdominal discomfort but no epigastric or abdominal pain.  He has nausea and had 1 food content emesis while in the emergency department.  Has indwelling Barr catheter since 06/10/2020 that is 
draining poorly.  He reports no hematuria or flank tenderness.

Upon arrival to the ER the patient is afebrile with temperature of 90.7?, heart rate of 63, blood pressure 118/68, respirations 16 saturating 98 % on room air.  A chest x-ray obtained reveals no acute cardiopulmonary processes.  CT the abdomen finds 
a prominent hiatal hernia with fluid in the distal esophagus, liver nodularity with TIPS, calcified masslike appearance i within the gallbladder and additional ill-defined density within the gallbladder lumen, neoplasm cannot be excluded multiple 
splenic hypodensities slightly more prominent when compared to prior exam, may represent small cysts/hemangiomas, metastatic disease or persistent infection cannot be excluded, moderate stool consistent with constipation, no colonic wall thickening 
or bowel obstruction, mild right effusion with superimposed consolidation improved from prior exam.  On laboratory analysis the patient has a normal white count at 4.9 with elevated monocytes 19.1%, hemoglobin of 12.7, hematocrit of 36.3 and 
platelets of 71. He has a PT of 15.5, INR of 1.3 and PTT of 29. He has a sodium of 134, potassium of 4.1, BUN of 24 and a creatinine of 1.19.  His nonfasting glucose is 205. He has an elevated bilirubin of 1.4, AST of 56, ALT of 49 and alkaline 
phosphatase of 120. His lipase is mildly elevated at 395 and has an albumin of 3.7.  His ammonia levels 153. His total CK is 24 and troponin is less than 0.012.  While in the ER patient complains of suprapubic discomfort and thing catheter has not 
been draining well.  Nursing attempted to flush catheter in found resistance.  A bladder scan finds a bladder volume of over 600 cc.  Due to the date of the catheter in the lack patency and patient discomfort the Barr catheter was discontinued.  
Lactulose 20 g is ordered in 
[id number]|2020-07-05 23:04:35|2020-07-05 23:04:35|PC.NURSE||||"MAINOR Roberts made aware pt has not stooled this evening shift. Inquired re orders for labs in the a.m. as per spouse's inquisition. "

## 2020-07-04 NOTE — P.HP_ITS
"History of Present Illness    
History of Present Illness    
Date Patient Seen: 07/04/20    
Time Patient Seen: 20:54    
Chief complaint: lethargic,meds not working,confusion,dizzy    
Narrative: Mr. Calvin Meraz is a 75-year-old  male male with a history   
of cryptogenic liver cirrhosis with ascites, status post TIPS (06/08/2020 at   
[facility]), splenic mass, cholelithiasis, non insulin-dependent   
diabetes type 2, urinary retention with indwelling catheter placed on 06/10/2020  
on discharge from [facility] who presents to the emergency   
department with increasing weakness over several days and nausea.  The patient   
is somewhat poor historian and his wife is at bedside providing additional   
information.  The patient has been on lactulose therapy until today with   
decreased stooling.  He had 1 BM today that was normal and formed with his last   
bowel movement prior to that being 4 days ago.  Presented with increased   
weakness and confusion that has been progressive for several days and has   
developed nausea and has been unable to take lactulose today.  He also reports   
complaints of intermittent dressed pressure that is nonpleuritic and nonradiati  
ng without associated shortness of breath or diaphoresis that he states feels   
similar to when he had pleural effusions previously.  The patient was admitted   
to [facility] under similar circumstances between June 26 and June 28 at   
which time he was diagnosed with urinary tract infection, and hepatic   
encephalopathy.  The patient was discharged on Levaquin that he has been taking   
with 2 doses remaining.  The patient denies complaints of fevers or chills her   
COVID-19 exposure.  He has no complaints of headaches or dizziness and sustained  
no falls.  He reports no nasal congestion or sore throat.  He has chest pressure  
as described above which is intermittent and nonpleuritic.  He denies complaints  
of shortness of breath, cough or wheezing.  He does complain a suprapubic   
abdominal discomfort but no epigastric or abdominal pain.  He has nausea and had  
1 food content emesis while in the emergency department.  Has indwelling Barr   
catheter since 06/10/2020 that is draining poorly.  He reports no hematuria or   
flank tenderness.    
    
Upon arrival to the ER the patient is afebrile with temperature of 90.7?, heart   
rate of 63, blood pressure 118/68, respirations 16 saturating 98 % on room air.   
A chest x-ray obtained reveals no acute cardiopulmonary processes.  CT the   
abdomen finds a prominent hiatal hernia with fluid in the distal esophagus,   
liver nodularity with TIPS, calcified masslike appearance i within the   
gallbladder and additional ill-defined density within the gallbladder lumen, n  
eoplasm cannot be excluded multiple splenic hypodensities slightly more   
prominent when compared to prior exam, may represent small cysts/hemangiomas,   
metastatic disease or persistent infection cannot be excluded, moderate stool   
consistent with constipation, no colonic wall thickening or bowel obstruction,   
mild right effusion with superimposed consolidation improved from prior exam.    
On laboratory analysis the patient has a normal white count at 4.9 with elevated  
monocytes 19.1%, hemoglobin of 12.7, hematocrit of 36.3 and platelets of 71. He   
has a PT of 15.5, INR of 1.3 and PTT of 29. He has a sodium of 134, potassium of  
4.1, BUN of 24 and a creatinine of 1.19.  His nonfasting glucose is 205. He has   
an elevated bilirubin of 1.4, AST of 56, ALT of 49 and alkaline phosphatase of   
120. His lipase is mildly elevated at 395 and has an albumin of 3.7.  His   
ammonia levels 153. His total CK is 24 and troponin is less than 0.012.  While   
in the ER patient complains of suprapubic discomfort and thing catheter has not   
been draining well.  Nursing attempted to flush catheter in found resistance.  A  
bladder scan finds a b
[id number]|2020-07-07 10:45:00|2020-07-07 10:45:00|FLORENTINO|CRISTAL|Health Information Management|[id number]|"History of Present Illness

## 2020-07-04 NOTE — PC.NURSE
1900: assumed care of pt. report from ASAD Sood. pt resting in bed. states nausea. taken to and from CT. wife made RN aware that martinez is not draining. martinez repositioned and draining noted. Martinez has been in place since 6/11. pt has not had much to 
drink due to nausea therefore not much urine to drain, urine in bag is concentrated and cloudy. denies pain. Had TIPS procedure at . scheduled to have colon resection surgery in 2 weeks. 

2030: Advised by Dr Fitzpatrick not to remove martinez cath since it was a hard placement per . Attempted to irrigate with minimal success. pt actively vomiting--approx 150-200mL of blue colored emesis pt states he ate as assortment of berries this 
morning. Zofran given for nausea. Lactulose ordered but will hold until nausea subsides. IVF infusing at 125ml/hr. Fausto Roberts NP in for evaluation. Plan for admit to acute care.

## 2020-07-04 NOTE — ED_ITS
"HPI - General Adult    
General    
Chief complaint: Dizziness    
Stated complaint: lethargic,meds not working,confusion,dizzy    
Time Seen by Provider: 07/04/20 18:07    
Source: patient    
Mode of arrival: Family Vehicle    
Limitations: no limitations    
History of Present Illness    
HPI narrative: 75-year-old male.  Non-insulin-dependent diabetic.  Approximately  
1 month ago underwent a TIPPS procedure the [facility].  Taran  
ately 7-10 days ago was admitted to our facility with the diagnosis of hepatic   
encephalopathy and urinary tract infection.  Patient's wife states that during   
that visit he was treated with antibiotics.  He was also given lactulose at a   
higher dose than what he was on prior to that admission.  She states that he had  
1 bowel movement.  Was sent home on lactulose.  Since he was discharged earlier   
this week he has been declining steadily every day to include weakness and   
inability to stand and some confusion and unsteadiness on his feet.  He states   
that he has had some chest discomfort and some shortness of breath however this   
is not necessarily new for him.  He has been doing his lactulose.  Has not had a  
bowel movement since he was discharged from the hospital.  They contacted the   
nurse for his hepatologist who informed him that he should come into the   
emergency department.    
Related Data    
                                Home Medications    
    
    
    
 Medication  Instructions  Recorded  Confirmed    
     
tamsulosin [Flomax] 0.4 mg PO QDAY #0 05/01/12 07/04/20    
     
furosemide 40 mg PO BID 01/15/20 07/04/20    
     
spironolactone 50 mg PO BID 01/15/20 07/04/20    
    
    
                                  Previous Rx's    
    
    
    
 Medication  Instructions  Recorded    
     
blood-glucose meter [Glucocard 01 #1 each 06/28/20    
    
Meter]      
     
lactulose 60 ml PO TID-QID #0 ml 06/28/20    
     
levofloxacin 750 mg PO DAILY #7 tab 06/28/20    
     
metformin 500 mg PO BID #60 tab 06/28/20    
     
nystatin-triamcinolone 1 applic TOPICAL BID #30 gram 06/28/20    
    
    
    
                                    Allergies    
    
    
    
Allergy/AdvReac Type Severity Reaction Status Date / Time    
     
No Known Drug Allergies Allergy   Verified 07/04/20 16:33    
    
    
    
    
Review of Systems    
Constitutional    
Constitutional: Reports fatigue, Denies fever(s), Denies headache(s), Reports   
malaise and Reports weakness    
Eyes    
Eyes: Denies change in vision    
ENT    
Ears, Nose, Mouth, and Throat: Denies headache(s)    
Cardiovascular    
Cardiovascular: Reports chest pain and Reports dyspnea    
Respiratory    
Respiratory: Reports dyspnea    
Gastrointestinal    
Gastrointestinal: Denies abdominal pain    
Comments: No bowel movement since being discharged from the hospital.    
Genitourinary    
Comments: Indwelling Barr catheter secondary to urine retention after his   
procedure.  Has not drained since arriving here in the ER.    
Integumentary/Breasts    
Skin/Breast: Denies rash    
Neurologic    
Neurologic: Denies behavioral changes, Denies headache(s) and Reports weakness    
Psychiatric    
Psychiatric: Denies behavioral changes    
Endocrine    
Endocrine: Reports fatigue    
Hematologic/Lymphatic    
Hematologic/Lymphatic: Denies easy bleeding and Denies easy bruising    
    
Patient History    
Medical History (Reviewed 07/04/20 @ 22:01 by Ed Fitzpatrick DO)    
    
Ascites (Inactive)    
Cirrhosis (Inactive)    
Gallstones (Inactive)    
Hepatic encephalopathy (Acute)    
MCCOY (nonalcoholic steatohepatitis) (Acute)    
Neutropenia (Resolved)    
Splenic mass (Inactive)    
Thrombocytopenia (Chronic)    
Urinary retention (Acute)    
    
    
Surgical History (Updated 07/04/20 @ 21:43 by MAINOR Campuzano)    
    
S/P TIPS (transjugular intrahepatic portos
[id number]|2020-07-04 19:52:00|2020-07-04 20:38:00|DI.CT.S_ITS|CLIK|Imaging|[id number]|"PROCEDURE:  CT ABDOMEN PELVIS W CON

## 2020-07-04 NOTE — ED.GENADULT
HPI - General Adult
General
Chief complaint: Dizziness
Stated complaint: lethargic,meds not working,confusion,dizzy
Time Seen by Provider: 07/04/20 18:07
Source: patient
Mode of arrival: Family Vehicle
Limitations: no limitations
History of Present Illness
HPI narrative: 75-year-old male.  Non-insulin-dependent diabetic.  Approximately 1 month ago underwent a TIPPS procedure the [facility].  Approximately 7-10 days ago was admitted to our facility with the diagnosis of hepatic 
encephalopathy and urinary tract infection.  Patient's wife states that during that visit he was treated with antibiotics.  He was also given lactulose at a higher dose than what he was on prior to that admission.  She states that he had 1 bowel 
movement.  Was sent home on lactulose.  Since he was discharged earlier this week he has been declining steadily every day to include weakness and inability to stand and some confusion and unsteadiness on his feet.  He states that he has had some 
chest discomfort and some shortness of breath however this is not necessarily new for him.  He has been doing his lactulose.  Has not had a bowel movement since he was discharged from the hospital.  They contacted the nurse for his hepatologist who 
informed him that he should come into the emergency department.
Related Data
Home Medications

 Medication  Instructions  Recorded  Confirmed
tamsulosin [Flomax] 0.4 mg PO QDAY #0 05/01/12 07/04/20
furosemide 40 mg PO BID 01/15/20 07/04/20
spironolactone 50 mg PO BID 01/15/20 07/04/20

Previous Rx's

 Medication  Instructions  Recorded
blood-glucose meter [Glucocard 01 #1 each 06/28/20
Meter]  
lactulose 60 ml PO TID-QID #0 ml 06/28/20
levofloxacin 750 mg PO DAILY #7 tab 06/28/20
metformin 500 mg PO BID #60 tab 06/28/20
nystatin-triamcinolone 1 applic TOPICAL BID #30 gram 06/28/20


Allergies

Allergy/AdvReac Type Severity Reaction Status Date / Time
No Known Drug Allergies Allergy   Verified 07/04/20 16:33



Review of Systems
Constitutional
Constitutional: Reports fatigue, Denies fever(s), Denies headache(s), Reports malaise and Reports weakness
Eyes
Eyes: Denies change in vision
ENT
Ears, Nose, Mouth, and Throat: Denies headache(s)
Cardiovascular
Cardiovascular: Reports chest pain and Reports dyspnea
Respiratory
Respiratory: Reports dyspnea
Gastrointestinal
Gastrointestinal: Denies abdominal pain
Comments: No bowel movement since being discharged from the hospital.
Genitourinary
Comments: Indwelling Barr catheter secondary to urine retention after his procedure.  Has not drained since arriving here in the ER.
Integumentary/Breasts
Skin/Breast: Denies rash
Neurologic
Neurologic: Denies behavioral changes, Denies headache(s) and Reports weakness
Psychiatric
Psychiatric: Denies behavioral changes
Endocrine
Endocrine: Reports fatigue
Hematologic/Lymphatic
Hematologic/Lymphatic: Denies easy bleeding and Denies easy bruising

Patient History
Medical History (Reviewed 07/04/20 @ 22:01 by Ed Fitzpatrick DO)

Ascites (Inactive)
Cirrhosis (Inactive)
Gallstones (Inactive)
Hepatic encephalopathy (Acute)
MCCOY (nonalcoholic steatohepatitis) (Acute)
Neutropenia (Resolved)
Splenic mass (Inactive)
Thrombocytopenia (Chronic)
Urinary retention (Acute)


Surgical History (Updated 07/04/20 @ 21:43 by MAINOR Campuzano)

S/P TIPS (transjugular intrahepatic portosystemic shunt) (Acute)


Family History (Reviewed 07/04/20 @ 21:43 by MAINOR Campuzano)
Mother
 Pancreatic cancer
Father
 Colon cancer


Social History (Reviewed 07/04/20 @ 22:01 by Ed Fitzpatrick DO)
household members:  spouse 
Smoking Status:  Former smoker 


Smoking Status: Former smoker
alcohol intake frequency: 0-2 drinks per day
Substance Use Type: does not use

Exam
Initial Vital Signs
Initial Vital Signs: Vital Signs

Temperature  98.7 F   07/04/20 16:27
Pulse Rate  63   07/04/20 16:27
Respiratory Rate  16   07/04/20 16:27
Blood Pressure  118/68
[id number]|2020-07-07 10:44:44|2020-07-07 10:44:44|CM.DPC||||

## 2020-07-04 NOTE — DI.RAD.S_ITS
PROCEDURE:  XR CHEST 1V  
   
INDICATIONS:  chest pain  
   
TECHNIQUE:  One view of the chest was acquired.    
   
COMPARISON:  [facility], CR, XR CHEST 1V, 6/26/2020, 16:44.  
   
FINDINGS:    
   
Surgical changes and devices:  None.    
   
Lungs and pleura:  Lungs are clear.  No pleural effusions or pneumothorax.    
   
Mediastinum:  Mediastinal contours appear normal.  Heart size is normal.    
   
Bones and chest wall:  No suspicious bony lesions.  Overlying soft tissues appear   
unremarkable.  Old, healed right rib fracture.  
   
IMPRESSION:  No evidence acute pulmonary process.  
   
   
   
   
Dictated by: Kings Escobedo M.D. on 7/04/2020 at 16:03       
Approved by: Kings Escobedo M.D. on 7/04/2020 at 16:05

## 2020-07-05 VITALS
TEMPERATURE: 98.24 F | RESPIRATION RATE: 16 BRPM | SYSTOLIC BLOOD PRESSURE: 145 MMHG | HEART RATE: 78 BPM | OXYGEN SATURATION: 95 % | DIASTOLIC BLOOD PRESSURE: 57 MMHG

## 2020-07-05 VITALS
TEMPERATURE: 97.34 F | DIASTOLIC BLOOD PRESSURE: 61 MMHG | RESPIRATION RATE: 16 BRPM | SYSTOLIC BLOOD PRESSURE: 132 MMHG | HEART RATE: 77 BPM | OXYGEN SATURATION: 98 %

## 2020-07-05 VITALS
HEART RATE: 73 BPM | SYSTOLIC BLOOD PRESSURE: 146 MMHG | DIASTOLIC BLOOD PRESSURE: 56 MMHG | OXYGEN SATURATION: 98 % | RESPIRATION RATE: 16 BRPM | TEMPERATURE: 98.06 F

## 2020-07-05 VITALS
SYSTOLIC BLOOD PRESSURE: 134 MMHG | OXYGEN SATURATION: 97 % | DIASTOLIC BLOOD PRESSURE: 52 MMHG | HEART RATE: 64 BPM | TEMPERATURE: 98.78 F | RESPIRATION RATE: 16 BRPM

## 2020-07-05 VITALS
RESPIRATION RATE: 16 BRPM | TEMPERATURE: 98.8 F | OXYGEN SATURATION: 96 % | HEART RATE: 76 BPM | SYSTOLIC BLOOD PRESSURE: 126 MMHG | DIASTOLIC BLOOD PRESSURE: 48 MMHG

## 2020-07-05 VITALS
HEART RATE: 87 BPM | RESPIRATION RATE: 17 BRPM | OXYGEN SATURATION: 94 % | TEMPERATURE: 97.9 F | DIASTOLIC BLOOD PRESSURE: 59 MMHG | SYSTOLIC BLOOD PRESSURE: 157 MMHG

## 2020-07-05 VITALS
HEART RATE: 94 BPM | RESPIRATION RATE: 16 BRPM | TEMPERATURE: 97.4 F | DIASTOLIC BLOOD PRESSURE: 54 MMHG | SYSTOLIC BLOOD PRESSURE: 140 MMHG | OXYGEN SATURATION: 97 %

## 2020-07-05 VITALS — OXYGEN SATURATION: 97 %

## 2020-07-05 VITALS — OXYGEN SATURATION: 94 %

## 2020-07-05 VITALS — OXYGEN SATURATION: 98 %

## 2020-07-05 LAB
ADD MANUAL DIFF / SLIDE REVIEW: NO
ALBUMIN SERPL-MCNC: 3.3 G/DL (ref 3.5–5)
ALBUMIN/GLOB SERPL: 1.1 {RATIO} (ref 1–2.8)
ALP SERPL-CCNC: 101 U/L (ref 38–126)
ALT SERPL-CCNC: 44 IU/L (ref ?–50)
AMMONIA PLAS-SCNC: 98 UMOL/L (ref 9–30)
ANISOCYTOSIS BLD QL: (no result)
BUN SERPL-MCNC: 23 MG/DL (ref 9–20)
CALCIUM SERPL-MCNC: 9.4 MG/DL (ref 8.4–10.2)
CHLORIDE SERPL-SCNC: 104 MMOL/L (ref 98–107)
CO2 SERPL-SCNC: 22 MMOL/L (ref 22–32)
ESTIMATED GLOMERULAR FILT RATE: 54.3 ML/MIN (ref 60–?)
GLOBULIN SER CALC-MCNC: 2.9 G/DL (ref 1.7–4.1)
GLUCOSE SERPL-MCNC: 163 MG/DL (ref 80–110)
HEMATOCRIT: 35.5 % (ref 41–53)
HEMOGLOBIN: 12.3 G/DL (ref 13.5–17.5)
HEMOLYSIS: < 15 (ref 0–50)
HEMOLYSIS: < 15 (ref 0–50)
LYMPHOCYTES # SPEC AUTO: 1200 /UL (ref 1100–4500)
MCV RBC: 86.3 FL (ref 80–100)
MEAN CORPUSCULAR HEMOGLOBIN: 29.8 PG (ref 26–34)
MEAN CORPUSCULAR HGB CONC: 34.6 % (ref 30–36)
PLATELET COUNT: 62 X10^3/UL (ref 150–400)
POTASSIUM SERPL-SCNC: 4.1 MMOL/L (ref 3.4–5.1)
PROT SERPL-MCNC: 6.2 G/DL (ref 6.3–8.2)
SODIUM SERPL-SCNC: 134 MMOL/L (ref 137–145)

## 2020-07-05 RX ADMIN — SODIUM CHLORIDE 50 ML: 0.9 INJECTION, SOLUTION INTRAVENOUS at 19:10

## 2020-07-05 RX ADMIN — SPIRONOLACTONE 50 MG: 50 TABLET ORAL at 08:03

## 2020-07-05 RX ADMIN — ENOXAPARIN SODIUM 40 MG: 40 INJECTION SUBCUTANEOUS at 08:03

## 2020-07-05 RX ADMIN — INSULIN ASPART 0 UNIT: 100 INJECTION, SOLUTION INTRAVENOUS; SUBCUTANEOUS at 12:58

## 2020-07-05 RX ADMIN — METOCLOPRAMIDE 10 MG: 5 INJECTION, SOLUTION INTRAMUSCULAR; INTRAVENOUS at 05:54

## 2020-07-05 RX ADMIN — FUROSEMIDE 40 MG: 40 TABLET ORAL at 08:03

## 2020-07-05 RX ADMIN — METOCLOPRAMIDE 10 MG: 5 INJECTION, SOLUTION INTRAMUSCULAR; INTRAVENOUS at 12:58

## 2020-07-05 RX ADMIN — ROPINIROLE HYDROCHLORIDE 0.25 MG: 0.25 TABLET, FILM COATED ORAL at 21:35

## 2020-07-05 RX ADMIN — INSULIN ASPART 0 UNIT: 100 INJECTION, SOLUTION INTRAVENOUS; SUBCUTANEOUS at 17:10

## 2020-07-05 RX ADMIN — LACTULOSE 45 GM: 20 SOLUTION ORAL at 21:34

## 2020-07-05 RX ADMIN — LACTULOSE 45 GM: 20 SOLUTION ORAL at 08:00

## 2020-07-05 RX ADMIN — ONDANSETRON 4 MG: 2 INJECTION INTRAMUSCULAR; INTRAVENOUS at 10:52

## 2020-07-05 RX ADMIN — LACTULOSE 45 GM: 20 SOLUTION ORAL at 12:42

## 2020-07-05 RX ADMIN — INSULIN ASPART 0 UNIT: 100 INJECTION, SOLUTION INTRAVENOUS; SUBCUTANEOUS at 08:01

## 2020-07-05 RX ADMIN — FINASTERIDE 5 MG: 5 TABLET, FILM COATED ORAL at 21:32

## 2020-07-05 RX ADMIN — SPIRONOLACTONE 50 MG: 50 TABLET ORAL at 21:32

## 2020-07-05 RX ADMIN — TAMSULOSIN HYDROCHLORIDE 0.4 MG: 0.4 CAPSULE ORAL at 08:03

## 2020-07-05 RX ADMIN — METOCLOPRAMIDE 10 MG: 5 INJECTION, SOLUTION INTRAMUSCULAR; INTRAVENOUS at 21:31

## 2020-07-05 RX ADMIN — FUROSEMIDE 40 MG: 40 TABLET ORAL at 21:31

## 2020-07-05 RX ADMIN — LACTULOSE 45 GM: 20 SOLUTION ORAL at 17:11

## 2020-07-05 RX ADMIN — Medication 10 ML: at 10:52

## 2020-07-05 NOTE — CM.DANOTE
Addendum entered by Loretta Jackson R.N.  07/05/20 10:03: 

Spoke to Charisse at [facility]. Will fax her over H&P. During team rounds, Dr. George indicated that patient could possibly go home tomorrow, no P.T. is needed at this time. Will send over resumption orders if he becomes inpatient status, to 
[facility].

Original Note:



DCP: Case received, EMR reviewed and met with patient. Introduced self and role. Was able to meet with patient and obtain some information regarding his baseline activity level and living situation. Was also able to obtain same information from 
recent visit here in the hospital with care management's notes. DCP assessment completed with information currently available.

Patient came to the hospital via family vehicle secondary to lethargy and confusion. Patient has history of liver cirrhosis, ascites, and had a recent TIP at [facility]. Patient also sees a hepatologist. Patient had gone home with a martinez at 
last admit, but ended up with retention, UTI. He is also diagnosed with hepatic encephalopathy. Patient is getting increased amounts of lactulose here in the hospital, due to his ammonia level.

Looking at recent note from last admission, patient was discharged home with [facility], nursing and P.T. Spoke to patient in his room. He was sitting up in his bed, alert and oriented, eating his breakfast. He  remembered being here 
recently . Confirmed with him that he resides with his spouse, Jamee. He no longer drives. Stated that he uses a walker when weak. 

P: DCP to continue to follow for needs. Will also update [facility] as well.


Loretta Jackson RN/

## 2020-07-05 NOTE — PM.PN.1
Subjective
Subjective
Date Patient Seen: 07/05/20
Time Patient Seen: 09:33
Interval history: Calvin Meraz is a 75-year-old male with a past medical history significant for nonalcoholic cirrhosis status post recent TIPS and BPH with chronic urinary retention and indwelling Barr catheter who presented to the ED with 
increasing weakness and confusion and was admitted for hepatic encephalopathy with disorientation.  His disorientation is improved this morning, but he still is very weak and confused.  He feels very weak, and also complains of dizziness when he 
tries to get up, although he does admit he has not got not this morning.  He has not had a bowel movement since yesterday, and had not had a bowel movement for 2 days before that.  His lactulose has been increased.  

Exam
Vital Signs
(past 8 hours):  -

 07/05/20
04:45 07/05/20
08:16
Temperature 97.4 F L 98.7 F
Pulse Rate 77 64
Respiratory Rate 16 16
Blood Pressure 132/61 134/52 L
Pulse Oximetry 98 97



Oxygen Delivery Method         Room Air
Oxygen Flow Rate               0



Narrative
Exam Narrative: General:  Older male lying in bed and in no acute distress, well-developed, well-nourished, slowed mentation and lethargy but otherwise appropriately interactive
HEENT: Normocephalic, atraumatic. External ears without defect. Pupils equal, round, and reactive to light.  Sclera are slightly icteric. Moist conjunctivae and no lid lag.  Oropharynx free of erythema and cobble stoning with moist mucosa. Breath 
smells of fetor hepaticus.
Neck: Supple with full range of motion. No lymphadenopathy or thyromegaly.
Chest: Spider angiomata present, equal chest rise bilaterally, non-tender.
Cardiovascular: Regular rate and rhythm without murmurs, rubs, or gallops appreciated
Pulmonary: Clear to auscultation bilaterally without crackles, wheezes, or rhonchi.  Normal respiratory effort with no use of accessory muscles.
Abdomen:  Soft, bowel sounds present, nontender, nondistended.  No fluid wave or abdominal ascites.  No hepatosplenomegaly or masses appreciated.
Extremities: No clubbing, cyanosis, or edema.
Skin: Normal temperature, turgor, and texture; no rash, ulcers, or subcutaneous nodules appreciated.
Neurological: Cranial nerves grossly intact.  Generalized weakness.  No asterixis.
Psychiatric:  Depressed mood and affect. Alert and oriented to person, place, and time

Objective
Labs

Result Diagrams: 
07/05/20 04:50          

07/05/20 04:50          

Labs:  Laboratory Results - last 24 hr

  07/04/20 07/04/20 07/04/20
  16:40 16:40 16:40
WBC  4.9  
RBC  4.24 L  
Hgb  12.7 L  
Hct  36.3 L  
MCV  85.5  
MCH  30.0  
MCHC  35.1  
RDW  20.8 H  
Plt Count  71 L  
Neut % (Auto)  54.2  
Lymph % (Auto)  25.1  
Mono % (Auto)  19.1 H  
Eos % (Auto)  1.3 L  
Baso % (Auto)  0.3  
Neut # (Auto)  2700  
Lymph # (Auto)  1200  
[place] # (Auto)  900  
Eos # (Auto)  100  
Baso # (Auto)  0  
Nucleated RBCs  Cancelled  
Hypersegmented Neuts  Cancelled  
Hypogranular Neuts  Cancelled  
Reactive Lymphocytes  Cancelled  
Smudge Cells  Cancelled  
Other Cell Type  Cancelled  
Toxic Granulation  Cancelled  
Toxic Vacuolation  Cancelled  
Dohle Bodies  Cancelled  
Abiel Rods  Cancelled  
WBC Morphology Comment  Cancelled  
Platelet Estimate  Cancelled  
Clumped Platelets  Cancelled  
Plt Morphology Comment  Cancelled  
RBC Morphology  Cancelled  
Dimorphic RBCs  Cancelled  
Polychromasia  Cancelled  
Hypochromasia  Cancelled  
Poikilocytosis  Cancelled  
Basophilic Stippling  Cancelled  
Anisocytosis  Cancelled  
Microcytosis  Cancelled  
Macrocytosis  Cancelled  
Spherocytes  Cancelled  
Pappenheimer Bodies  Cancelled  
Sickle Cells  Cancelled  
Target Cells  Cancelled  
Tear Drop Cells  Cancelled  
Ovalocytes  Cancelled  
Stomatocytes  Cancelled  
Helmet Cells  Cancelled  
Pradhan-Jolly Bodies  Cancelled  
Cabot Rings  Cancelled  
Hieu Cells  Cancelled  
Acanthocytes (Spur)  Cancelled  
Rouleaux  C
[id number]|2020-07-07 10:14:00|2020-07-07 10:14:00|OT.IP.TRT||||

## 2020-07-05 NOTE — P.PN_ITS
"Subjective    
Subjective    
Date Patient Seen: 07/05/20    
Time Patient Seen: 09:33    
Interval history: Calvin Meraz is a 75-year-old male with a past medical history  
significant for nonalcoholic cirrhosis status post recent TIPS and BPH with   
chronic urinary retention and indwelling Barr catheter who presented to the ED   
with increasing weakness and confusion and was admitted for hepatic   
encephalopathy with disorientation.  His disorientation is improved this   
morning, but he still is very weak and confused.  He feels very weak, and also   
complains of dizziness when he tries to get up, although he does admit he has   
not got not this morning.  He has not had a bowel movement since yesterday, and   
had not had a bowel movement for 2 days before that.  His lactulose has been   
increased.      
    
Exam    
Vital Signs    
(past 8 hours):                         -    
    
    
    
 07/05/20    
04:45 07/05/20    
08:16    
     
Temperature 97.4 F L 98.7 F    
     
Pulse Rate 77 64    
     
Respiratory Rate 16 16    
     
Blood Pressure 132/61 134/52 L    
     
Pulse Oximetry 98 97    
    
    
                                            
    
    
    
Oxygen Delivery Method         Room Air    
     
Oxygen Flow Rate               0    
    
    
    
    
Narrative    
Exam Narrative: General:  Older male lying in bed and in no acute distress,   
well-developed, well-nourished, slowed mentation and lethargy but otherwise   
appropriately interactive    
HEENT: Normocephalic, atraumatic. External ears without defect. Pupils equal,   
round, and reactive to light.  Sclera are slightly icteric. Moist conjunctivae   
and no lid lag.  Oropharynx free of erythema and cobble stoning with moist   
mucosa. Breath smells of fetor hepaticus.    
Neck: Supple with full range of motion. No lymphadenopathy or thyromegaly.    
Chest: Spider angiomata present, equal chest rise bilaterally, non-tender.    
Cardiovascular: Regular rate and rhythm without murmurs, rubs, or gallops   
appreciated    
Pulmonary: Clear to auscultation bilaterally without crackles, wheezes, or   
rhonchi.  Normal respiratory effort with no use of accessory muscles.    
Abdomen:  Soft, bowel sounds present, nontender, nondistended.  No fluid wave or  
abdominal ascites.  No hepatosplenomegaly or masses appreciated.    
Extremities: No clubbing, cyanosis, or edema.    
Skin: Normal temperature, turgor, and texture; no rash, ulcers, or subcutaneous   
nodules appreciated.    
Neurological: Cranial nerves grossly intact.  Generalized weakness.  No   
asterixis.    
Psychiatric:  Depressed mood and affect. Alert and oriented to person, place,   
and time    
    
Objective    
Labs    
    
Result Diagrams:     
                                                        07/05/20 04:50              
    
                                                        07/05/20 04:50              
    
Labs:                    Laboratory Results - last 24 hr    
    
    
    
  07/04/20 07/04/20 07/04/20    
    
  16:40 16:40 16:40    
     
WBC  4.9      
     
RBC  4.24 L      
     
Hgb  12.7 L      
     
Hct  36.3 L      
     
MCV  85.5      
     
MCH  30.0      
     
MCHC  35.1      
     
RDW  20.8 H      
     
Plt Count  71 L      
     
Neut % (Auto)  54.2      
     
Lymph % (Auto)  25.1      
     
Mono % (Auto)  19.1 H      
     
Eos % (Auto)  1.3 L      
     
Baso % (Auto)  0.3      
     
Neut # (Auto)  2700      
     
Lymph # (Auto)  1200      
     
Mono # (Auto)  900      
     
Eos # (Auto)  100      
     
Baso # (Auto)  0      
     
Nucleated RBCs  Cancelled      
     
Hypersegmented Neuts  Cancelled      
     
Hypogranular Neuts  Cancelled      
     
Reactive Lymphocytes  Cancelled      
     
Smudge Cells  Cancelled      
     
Other Cell Type  Cancelled      
     
Toxic Granulation 
[id number]|2020-07-06 10:44:00|2020-07-06 10:44:00|PRESLEY_ITS|ANGELITAAGE|Health Information Management|[id number]|"Subjective

## 2020-07-06 VITALS
TEMPERATURE: 98.24 F | SYSTOLIC BLOOD PRESSURE: 122 MMHG | RESPIRATION RATE: 16 BRPM | DIASTOLIC BLOOD PRESSURE: 52 MMHG | HEART RATE: 86 BPM | OXYGEN SATURATION: 93 %

## 2020-07-06 VITALS
TEMPERATURE: 97.2 F | DIASTOLIC BLOOD PRESSURE: 65 MMHG | RESPIRATION RATE: 18 BRPM | SYSTOLIC BLOOD PRESSURE: 136 MMHG | HEART RATE: 79 BPM | OXYGEN SATURATION: 97 %

## 2020-07-06 VITALS
DIASTOLIC BLOOD PRESSURE: 76 MMHG | HEART RATE: 97 BPM | RESPIRATION RATE: 18 BRPM | OXYGEN SATURATION: 97 % | TEMPERATURE: 97.88 F | SYSTOLIC BLOOD PRESSURE: 146 MMHG

## 2020-07-06 VITALS
SYSTOLIC BLOOD PRESSURE: 149 MMHG | RESPIRATION RATE: 20 BRPM | DIASTOLIC BLOOD PRESSURE: 70 MMHG | OXYGEN SATURATION: 98 % | HEART RATE: 73 BPM | TEMPERATURE: 97.52 F

## 2020-07-06 VITALS
HEART RATE: 72 BPM | OXYGEN SATURATION: 97 % | DIASTOLIC BLOOD PRESSURE: 58 MMHG | RESPIRATION RATE: 16 BRPM | SYSTOLIC BLOOD PRESSURE: 138 MMHG | TEMPERATURE: 98.2 F

## 2020-07-06 VITALS
DIASTOLIC BLOOD PRESSURE: 47 MMHG | HEART RATE: 76 BPM | TEMPERATURE: 97.6 F | SYSTOLIC BLOOD PRESSURE: 121 MMHG | RESPIRATION RATE: 16 BRPM | OXYGEN SATURATION: 94 %

## 2020-07-06 VITALS — OXYGEN SATURATION: 93 %

## 2020-07-06 VITALS — OXYGEN SATURATION: 94 %

## 2020-07-06 VITALS — OXYGEN SATURATION: 97 %

## 2020-07-06 VITALS — OXYGEN SATURATION: 96 %

## 2020-07-06 LAB
ADD MANUAL DIFF / SLIDE REVIEW: NO
AMMONIA PLAS-SCNC: 74 UMOL/L (ref 9–30)
ANISOCYTOSIS BLD QL: (no result)
BUN SERPL-MCNC: 18 MG/DL (ref 9–20)
CALCIUM SERPL-MCNC: 9 MG/DL (ref 8.4–10.2)
CHLORIDE SERPL-SCNC: 106 MMOL/L (ref 98–107)
CO2 SERPL-SCNC: 18 MMOL/L (ref 22–32)
ESTIMATED GLOMERULAR FILT RATE: > 60 ML/MIN (ref 60–?)
GLUCOSE SERPL-MCNC: 176 MG/DL (ref 80–110)
HEMATOCRIT: 33.8 % (ref 41–53)
HEMOGLOBIN: 12 G/DL (ref 13.5–17.5)
HEMOLYSIS: < 15 (ref 0–50)
LYMPHOCYTES # SPEC AUTO: 1200 /UL (ref 1100–4500)
MAGNESIUM SERPL-MCNC: 2.2 MG/DL (ref 1.6–2.3)
MCV RBC: 85.7 FL (ref 80–100)
MEAN CORPUSCULAR HEMOGLOBIN: 30.3 PG (ref 26–34)
MEAN CORPUSCULAR HGB CONC: 35.4 % (ref 30–36)
PLATELET COUNT: 57 X10^3/UL (ref 150–400)
POTASSIUM SERPL-SCNC: 3.9 MMOL/L (ref 3.4–5.1)
SODIUM SERPL-SCNC: 134 MMOL/L (ref 137–145)

## 2020-07-06 RX ADMIN — LACTULOSE 45 GM: 20 SOLUTION ORAL at 12:58

## 2020-07-06 RX ADMIN — Medication 10 ML: at 13:05

## 2020-07-06 RX ADMIN — INSULIN ASPART 0 UNIT: 100 INJECTION, SOLUTION INTRAVENOUS; SUBCUTANEOUS at 12:58

## 2020-07-06 RX ADMIN — LACTULOSE 45 GM: 20 SOLUTION ORAL at 17:26

## 2020-07-06 RX ADMIN — FUROSEMIDE 40 MG: 40 TABLET ORAL at 22:10

## 2020-07-06 RX ADMIN — RIFAXIMIN 550 MG: 550 TABLET ORAL at 08:56

## 2020-07-06 RX ADMIN — FUROSEMIDE 40 MG: 40 TABLET ORAL at 08:58

## 2020-07-06 RX ADMIN — FINASTERIDE 5 MG: 5 TABLET, FILM COATED ORAL at 22:10

## 2020-07-06 RX ADMIN — LACTULOSE 45 GM: 20 SOLUTION ORAL at 22:10

## 2020-07-06 RX ADMIN — SPIRONOLACTONE 50 MG: 50 TABLET ORAL at 22:11

## 2020-07-06 RX ADMIN — INSULIN ASPART 0 UNIT: 100 INJECTION, SOLUTION INTRAVENOUS; SUBCUTANEOUS at 17:22

## 2020-07-06 RX ADMIN — SPIRONOLACTONE 50 MG: 50 TABLET ORAL at 08:57

## 2020-07-06 RX ADMIN — BISACODYL 10 MG: 10 SUPPOSITORY RECTAL at 14:28

## 2020-07-06 RX ADMIN — TAMSULOSIN HYDROCHLORIDE 0.4 MG: 0.4 CAPSULE ORAL at 08:56

## 2020-07-06 RX ADMIN — LACTULOSE 45 GM: 20 SOLUTION ORAL at 08:58

## 2020-07-06 RX ADMIN — RIFAXIMIN 550 MG: 550 TABLET ORAL at 22:11

## 2020-07-06 RX ADMIN — METOCLOPRAMIDE 10 MG: 5 INJECTION, SOLUTION INTRAMUSCULAR; INTRAVENOUS at 13:04

## 2020-07-06 RX ADMIN — METOCLOPRAMIDE 10 MG: 5 INJECTION, SOLUTION INTRAMUSCULAR; INTRAVENOUS at 05:38

## 2020-07-06 RX ADMIN — INSULIN ASPART 0 UNIT: 100 INJECTION, SOLUTION INTRAVENOUS; SUBCUTANEOUS at 08:57

## 2020-07-06 RX ADMIN — Medication 10 ML: at 08:58

## 2020-07-06 RX ADMIN — Medication 10 ML: at 22:11

## 2020-07-06 RX ADMIN — ROPINIROLE HYDROCHLORIDE 0.25 MG: 0.25 TABLET, FILM COATED ORAL at 22:11

## 2020-07-06 NOTE — DIET.PN
Dietary Progress Note

Assessment: Mr. Meraz is a 75-year-old male with a past medical history significant for nonalcoholic cirrhosis status post recent TIPS and BPH who presented to the ED with increasing weakness and confusion and was admitted for hepatic encephalopathy 
with disorientation.  He was sleeping during my assessment, but his wife Jamee was present to provide history.  She reports recent diagnosis of type 2 diabetes which she states came as a shock.  He is currently taking metformin 500mg BID.  She 
reports overall healthy eating habits low in sugary foods/beverages.  Since diagnosis they have cut down on fruits and replaced most starches with whole grains.  Wife reports weight loss in January related to severe ascites.  She reports scheduled 
colon resection in 2 weeks. 

HT: 182.88cm             WT: 83.9kg BMI: 25

Labs: Na: 134        Gluc: 163, 176        T Bili: 1.6         

MNA: 9  Zack: 18

Nutrition Diagnosis:   Altered Nutrition related labs related to impaired glucose metabolism, lack of previous exposure to accurate nutrition information as evidenced by pt report, dx of diabetes, previous diet high in refined carbohydrates.? 

Interventions:
1.  Provided instruction on carb conscious, low sodium nutrition therapy. 
2.  Discussed recommended BG ranges for fasting and 2 hr Post Prandial. 
3.  Reviewed basic pathophysiology of diabetes and organ systems.  
4.  Discussed  Diabetes Education Program.  Pt wife will request referral.  

Diet Order: CCD medium (3); cirrhotic 

EER: [id number] karime (25-27cal/kg); Pro: 84-109g (1-1.3)

Monitoring/Evaluations: weight, PO's, glucose, other associated labs

## 2020-07-06 NOTE — OT.IP.EVAL
"
Current Diagnoses

Acute and subacute hepatic failure without coma (07/04/20)

Past Medical History (Last Reviewed 07/04/20 @ 22:01 by Ed Fitzpatrick DO)

Ascites (Inactive)
Cirrhosis (Inactive)
Gallstones (Inactive)
Hepatic encephalopathy (Acute)
MCCOY (nonalcoholic steatohepatitis) (Acute)
Neutropenia (Resolved)
Splenic mass (Inactive)
Thrombocytopenia (Chronic)
Urinary retention (Acute)

Surgical History (Last Updated 07/04/20 @ 21:43 by MAINOR Campuzano)

S/P TIPS (transjugular intrahepatic portosystemic shunt) (Acute)

Occupational Therapy Inpatient Evaluation/Re-Eval

M1 PT/OT-IP Prior Functional Status                        Start:  07/06/20 13:12
Freq:   AS NEEDED                                          Status: Active        
Protocol:                                                                        
 Document     07/06/20 13:18  CGR  (Rec: 07/06/20 13:36  CGR  [id number])
 Medical Review
     Prior Functional Status
      Medical History Reviewed                   Yes
      Communication                              Pt is an effective verbal
                                                 communicator
      Mobility and Gait                          Pt was IND in all mobility
                                                 prior to ~1 month ago. Pt has
                                                 been using a 2WW since his
                                                 last admission.
      Activities of Daily Living and IADL's      Pt is IND or MOD I for all
                                                 ADLs. Pt's wife performs
                                                 cooking and cleaning.
     Social History
      Household Members                          spouse
      Living Arrangements                        House
      Number of Floors (Floors)                  One Floor
      Number of Stairs To Enter/Railing?         Pt has one step to enter.
      Home Environment                           High Toilet,Tub/Shower
      Home Equipment                             Front Wheel Walker,Hand Held
                                                 Shower
      Employment Status                          Retired
      Additional Social History Comment          Pt has access to a tub
                                                 transfer bench if needed. Pt
                                                 is a retired .
M2 OT-IP Current Condition                                 Start:  07/06/20 13:17
Freq:                                                      Status: Active        
Protocol:                                                                        
 Document     07/06/20 13:18  CGR  (Rec: 07/06/20 13:36  CGR  [email or web address])
 Occupational Therapy Current Condition
     Current Condition
      Evaluation Date                            07/06/20
      Treatment Diagnosis                        hepatic encephalopathy,
                                                 elevated amonia, urinary
                                                 retension.
      Diagnosis Onset Date                       07/04/20
M3 OT- IP Subjective and Pain                              Start:  07/06/20 13:17
Freq:                                                      Status: Active        
Protocol:                                                                        
 Document     07/06/20 13:18  CGR  (Rec: 07/06/20 13:36  CGR  [email or web address])
 OT- Subjective
     Occupational Therapy Visit Type
      Type                                       Initial Evaluation
      Visit Start Time                           12:52
      Visit Stop Time                            13:07
      Total Visit Minutes                        15
 OT Pain Assessment
     Pain
      When Pain Assessed                         At Rest
     Pain Present
      Pain Present                               Denied Pain
M4 OT- IP ADL's                                       
[id number]|2020-07-05 11:07:00|2020-07-05 11:07:00|P.HPOB_ITS|ELLIE|Health Information Management|[id number]|"OB HPI

## 2020-07-06 NOTE — PT.IIE
"
Current Diagnoses

Acute and subacute hepatic failure without coma (07/04/20)

Surgical History (Last Updated 07/04/20 @ 21:43 by MAINOR Campuzano)

S/P TIPS (transjugular intrahepatic portosystemic shunt) (Acute)

Medical History (Last Reviewed 07/04/20 @ 22:01 by Ed Fitzpatrick DO)

Ascites (Inactive)
Cirrhosis (Inactive)
Gallstones (Inactive)
Hepatic encephalopathy (Acute)
MCCOY (nonalcoholic steatohepatitis) (Acute)
Neutropenia (Resolved)
Splenic mass (Inactive)
Thrombocytopenia (Chronic)
Urinary retention (Acute)

Physical Therapy Inpatient Evaluation/Re-Eval

M1 PT/OT-IP Prior Functional Status                        Start:  07/06/20 13:12
Freq:   AS NEEDED                                          Status: Active        
Protocol:                                                                        
 Document     07/06/20 17:32  AW  (Rec: 07/06/20 17:44  AW  [id number])
 Medical Review
     Prior Functional Status
      Medical History Reviewed                   Yes
      Communication                              Pt is an effective verbal
                                                 communicator
      Mobility and Gait                          Pt was IND in all mobility
                                                 prior to ~1 month ago. Pt has
                                                 been using a 2WW since his
                                                 last admission.
      Activities of Daily Living and IADL's      Pt is IND or MOD I for all
                                                 ADLs. Pt's wife performs
                                                 cooking and cleaning.
     Social History
      Household Members                          spouse
      Living Arrangements                        House
      Number of Floors (Floors)                  One Floor
      Number of Stairs To Enter/Railing?         Pt has one step to enter.
      Home Environment                           High Toilet,Tub/Shower
      Home Equipment                             Front Wheel Walker,Hand Held
                                                 Shower
      Employment Status                          Retired
      Additional Social History Comment          Pt has access to a tub
                                                 transfer bench if needed. Pt
                                                 is a retired .
M2 PT-IP Current Condition                                 Start:  07/06/20 13:12
Freq:   AS NEEDED                                          Status: Active        
Protocol:                                                                        
 Document     07/06/20 17:32  AW  (Rec: 07/06/20 17:44  AW  [id number])
 Physical Therapy Current Condition
     Current Condition
      Evaluation Date                            07/06/20
      Treatment Diagnosis                        hepatic encephalopathy;
                                                 weakness; difficulty in
                                                 walking
M3 PT-IP Subjective                                        Start:  07/06/20 13:12
Freq:   AS NEEDED                                          Status: Active        
Protocol:                                                                        
 Document     07/06/20 17:32  AW  (Rec: 07/06/20 17:44  AW  [id number])
 Subjective
     Physical Therapy Visit Type
      Type                                       Initial Evaluation
      Visit Start Time                           14:54
      Visit Stop Time                            15:12
      Total Visit Minutes                        18
     Physical Therapy Visit Comments
      Patient Comments                           Pt willing to participate with
                                                 PT. He has not had a bowel
                                                 movement and is hoping walking
                                
[id number]|2020-07-05 22:30:06|2020-07-05 22:30:06|PC.NURSE||||"Rifimin not given - dosage not available.  550mg PO BID available per Sissy Mathews ([facility]).  Day shift to follow up per evening hospitalist.  Pt's wife concerned about pt not having a BM - would like an update in AM."

## 2020-07-07 VITALS — OXYGEN SATURATION: 97 %

## 2020-07-07 VITALS
SYSTOLIC BLOOD PRESSURE: 147 MMHG | TEMPERATURE: 98.2 F | HEART RATE: 80 BPM | DIASTOLIC BLOOD PRESSURE: 65 MMHG | OXYGEN SATURATION: 96 % | RESPIRATION RATE: 15 BRPM

## 2020-07-07 VITALS
HEART RATE: 88 BPM | SYSTOLIC BLOOD PRESSURE: 144 MMHG | OXYGEN SATURATION: 97 % | RESPIRATION RATE: 18 BRPM | TEMPERATURE: 97.9 F | DIASTOLIC BLOOD PRESSURE: 79 MMHG

## 2020-07-07 VITALS — OXYGEN SATURATION: 98 %

## 2020-07-07 LAB
ADD MANUAL DIFF / SLIDE REVIEW: NO
ALBUMIN SERPL-MCNC: 3.2 G/DL (ref 3.5–5)
ALBUMIN/GLOB SERPL: 1.1 {RATIO} (ref 1–2.8)
ALP SERPL-CCNC: 121 U/L (ref 38–126)
ALT SERPL-CCNC: 62 IU/L (ref ?–50)
ANISOCYTOSIS BLD QL: (no result)
BUN SERPL-MCNC: 19 MG/DL (ref 9–20)
CALCIUM SERPL-MCNC: 9.1 MG/DL (ref 8.4–10.2)
CHLORIDE SERPL-SCNC: 104 MMOL/L (ref 98–107)
CO2 SERPL-SCNC: 22 MMOL/L (ref 22–32)
ESTIMATED GLOMERULAR FILT RATE: 55.8 ML/MIN (ref 60–?)
GLOBULIN SER CALC-MCNC: 2.9 G/DL (ref 1.7–4.1)
GLUCOSE SERPL-MCNC: 183 MG/DL (ref 80–110)
HEMATOCRIT: 35.3 % (ref 41–53)
HEMOGLOBIN: 12.3 G/DL (ref 13.5–17.5)
HEMOLYSIS: < 15 (ref 0–50)
LYMPHOCYTES # SPEC AUTO: 1500 /UL (ref 1100–4500)
MAGNESIUM SERPL-MCNC: 2.3 MG/DL (ref 1.6–2.3)
MCV RBC: 85.4 FL (ref 80–100)
MEAN CORPUSCULAR HEMOGLOBIN: 29.8 PG (ref 26–34)
MEAN CORPUSCULAR HGB CONC: 34.9 % (ref 30–36)
PLATELET COUNT: 61 X10^3/UL (ref 150–400)
POTASSIUM SERPL-SCNC: 3.9 MMOL/L (ref 3.4–5.1)
PROT SERPL-MCNC: 6.1 G/DL (ref 6.3–8.2)
SODIUM SERPL-SCNC: 134 MMOL/L (ref 137–145)

## 2020-07-07 RX ADMIN — INSULIN ASPART 0 UNIT: 100 INJECTION, SOLUTION INTRAVENOUS; SUBCUTANEOUS at 09:38

## 2020-07-07 RX ADMIN — SPIRONOLACTONE 50 MG: 50 TABLET ORAL at 09:38

## 2020-07-07 RX ADMIN — TAMSULOSIN HYDROCHLORIDE 0.4 MG: 0.4 CAPSULE ORAL at 09:36

## 2020-07-07 RX ADMIN — LACTULOSE 45 GM: 20 SOLUTION ORAL at 09:37

## 2020-07-07 RX ADMIN — FUROSEMIDE 40 MG: 40 TABLET ORAL at 09:36

## 2020-07-07 RX ADMIN — Medication 10 ML: at 09:37

## 2020-07-07 RX ADMIN — RIFAXIMIN 550 MG: 550 TABLET ORAL at 09:38

## 2020-07-07 NOTE — PC.NURSE
Discharge: IV dc'd intact. Reviewed all d/c instructions and med list thoroughly with patient and his wife Jamee. Dr Flores also spoke with them at length about d/c info and outpatient POC. They are aware that there are scripts to be picked up at 
[facility] today. F/U scheduled with Dr Ordonez for next Wednesday (7/15) and patient already has F/U scheduled down at  on the 13th of July and plans to keep that. Patient and wife both verbalized understanding of d/c instructions and stated no 
further questions. All personal belongings sent with patient at d/c, wheeled out to private vehicle by nursing staff.

## 2020-07-07 NOTE — PT.IPTN
"
Current Diagnoses

Acute and subacute hepatic failure without coma (07/04/20)

Physical Therapy Treatment Note

M2 PT-IP Current Condition                                 Start:  07/06/20 13:12
Freq:   AS NEEDED                                          Status: Active        
Protocol:                                                                        
 Document     07/06/20 17:32  AW  (Rec: 07/06/20 17:44  AW  [id number])
 Physical Therapy Current Condition
     Current Condition
      Evaluation Date                            07/06/20
      Treatment Diagnosis                        hepatic encephalopathy;
                                                 weakness; difficulty in
                                                 walking
M3 PT-IP Subjective                                        Start:  07/06/20 13:12
Freq:   AS NEEDED                                          Status: Active        
Protocol:                                                                        
 Document     07/07/20 08:54  CLB  (Rec: 07/07/20 10:37  CLB  [id number])
 Subjective
     Physical Therapy Visit Type
      Type                                       Treatment Note
      Visit Start Time                           08:54
      Visit Stop Time                            09:21
      Total Visit Minutes                        27
      Number of PTA Visits                       1
     Physical Therapy Visit Comments
      Patient Comments                           Pt willing to participate with
                                                 therapy.
 Therapy Pain Assessment
     Pain
      When Pain Assessed                         During Mobility
     Pain Present
      Pain Present                               Denied Pain
M4 PT-IP Mobility and Gait                                 Start:  07/06/20 13:12
Freq:   AS NEEDED                                          Status: Active        
Protocol:                                                                        
 Document     07/07/20 08:54  CLB  (Rec: 07/07/20 10:37  CLB  [id number])
 PT-Transfer Assessment
     Sit to and From Stand
      Sit to and from Stand                      Standby Assistance,Use of
                                                 Upper Extremities
     Equipment
      Transfer Assistive Device                  Gait Belt,Front Wheeled Walker
     Transfers
      Transfer Destination                       Chair
      Transfer Technique                         pt ambulated with FWW
     Transfer Ability
      Level of Assist                            Standby Assistance
     Comments
      Mobility Comments                          Pt in chair upon arrival. Pt
                                                 stood with use of UE's pushing
                                                 from arms of chair. Pt
                                                 ambulated with FWW/SBA ~600ft.
                                                 Pt had steady gait and good
                                                 safety awareness. Pt recalled
                                                 directions to therapy stairs
                                                 from last admission. Pt
                                                 ambulated with slow pace to
                                                 start then able to increase
                                                 christin. Pt stated he feels so
                                                 much better. Pt returned to
                                                 room sitting in chair SBA.
                                                 Left pt in chair with wife
                                                 present, all needs within
                                                 reach. Informed RN of pt
                                                 mobilty.
 Gait Assessment
     Gait
      Gait Assistance Re
[id number]|2020-07-06 10:44:51|2020-07-06 10:44:51|PM.PN.1||||"Subjective

## 2020-07-07 NOTE — PM.DS.1
"History of Present Illness
History of Present Illness
Date Patient Seen: 07/04/20
Chief complaint: lethargic,meds not working,confusion,dizzy
Narrative: 

Written by Richard HAYWARD:


Mr. Calvin Meraz is a 75-year-old  male male with a history of cryptogenic liver cirrhosis with ascites, status post TIPS (06/08/2020 at [facility]), splenic mass, cholelithiasis, non insulin-dependent diabetes type 2, urinary 
retention with indwelling catheter placed on 06/10/2020 on discharge from [facility] who presents to the emergency department with increasing weakness over several days and nausea.  The patient is somewhat poor historian and his wife 
is at bedside providing additional information.  The patient has been on lactulose therapy until today with decreased stooling.  He had 1 BM today that was normal and formed with his last bowel movement prior to that being 4 days ago.  Presented 
with increased weakness and confusion that has been progressive for several days and has developed nausea and has been unable to take lactulose today.  He also reports complaints of intermittent dressed pressure that is nonpleuritic and nonradiating 
without associated shortness of breath or diaphoresis that he states feels similar to when he had pleural effusions previously.  The patient was admitted to [facility] under similar circumstances between June 26 and June 28 at which time he was 
diagnosed with urinary tract infection, and hepatic encephalopathy.  The patient was discharged on Levaquin that he has been taking with 2 doses remaining.  The patient denies complaints of fevers or chills her COVID-19 exposure.  He has no 
complaints of headaches or dizziness and sustained no falls.  He reports no nasal congestion or sore throat.  He has chest pressure as described above which is intermittent and nonpleuritic.  He denies complaints of shortness of breath, cough or 
wheezing.  He does complain a suprapubic abdominal discomfort but no epigastric or abdominal pain.  He has nausea and had 1 food content emesis while in the emergency department.  Has indwelling Barr catheter since 06/10/2020 that is draining 
poorly.  He reports no hematuria or flank tenderness.

Upon arrival to the ER the patient is afebrile with temperature of 90.7?, heart rate of 63, blood pressure 118/68, respirations 16 saturating 98 % on room air.  A chest x-ray obtained reveals no acute cardiopulmonary processes.  CT the abdomen finds 
a prominent hiatal hernia with fluid in the distal esophagus, liver nodularity with TIPS, calcified masslike appearance i within the gallbladder and additional ill-defined density within the gallbladder lumen, neoplasm cannot be excluded multiple 
splenic hypodensities slightly more prominent when compared to prior exam, may represent small cysts/hemangiomas, metastatic disease or persistent infection cannot be excluded, moderate stool consistent with constipation, no colonic wall thickening 
or bowel obstruction, mild right effusion with superimposed consolidation improved from prior exam.  On laboratory analysis the patient has a normal white count at 4.9 with elevated monocytes 19.1%, hemoglobin of 12.7, hematocrit of 36.3 and 
platelets of 71. He has a PT of 15.5, INR of 1.3 and PTT of 29. He has a sodium of 134, potassium of 4.1, BUN of 24 and a creatinine of 1.19.  His nonfasting glucose is 205. He has an elevated bilirubin of 1.4, AST of 56, ALT of 49 and alkaline 
phosphatase of 120. His lipase is mildly elevated at 395 and has an albumin of 3.7.  His ammonia levels 153. His total CK is 24 and troponin is less than 0.012.  While in the ER patient complains of suprapubic discomfort and thing catheter has not 
been draining well.  Nursing attempted to flush catheter in found resistance.  A bladder scan finds a bladder volume of over 600 cc.  Due to the date of the catheter in the lack patency and patient discomfort the Barr catheter was discontinued.  
Lactulose 20 g 
[id number]|2020-07-07 01:14:18|2020-07-07 01:14:18|PC.NURSE||||"Evening note:

## 2020-07-11 ENCOUNTER — HOSPITAL ENCOUNTER (EMERGENCY)
Age: 75
Discharge: HOME | End: 2020-07-11
Payer: MEDICARE

## 2020-07-11 VITALS
RESPIRATION RATE: 16 BRPM | HEART RATE: 74 BPM | OXYGEN SATURATION: 97 % | SYSTOLIC BLOOD PRESSURE: 136 MMHG | DIASTOLIC BLOOD PRESSURE: 62 MMHG

## 2020-07-11 VITALS
HEART RATE: 89 BPM | RESPIRATION RATE: 14 BRPM | TEMPERATURE: 98.24 F | SYSTOLIC BLOOD PRESSURE: 139 MMHG | OXYGEN SATURATION: 97 % | DIASTOLIC BLOOD PRESSURE: 64 MMHG

## 2020-07-11 VITALS — HEART RATE: 81 BPM | OXYGEN SATURATION: 99 % | RESPIRATION RATE: 16 BRPM

## 2020-07-11 VITALS — HEART RATE: 77 BPM | RESPIRATION RATE: 13 BRPM

## 2020-07-11 VITALS — HEART RATE: 80 BPM | RESPIRATION RATE: 18 BRPM

## 2020-07-11 VITALS — BODY MASS INDEX: 25 KG/M2 | BODY MASS INDEX: 23.8 KG/M2

## 2020-07-11 VITALS — RESPIRATION RATE: 13 BRPM | OXYGEN SATURATION: 98 % | HEART RATE: 81 BPM

## 2020-07-11 VITALS — RESPIRATION RATE: 17 BRPM | HEART RATE: 81 BPM

## 2020-07-11 DIAGNOSIS — R26.81: ICD-10-CM

## 2020-07-11 DIAGNOSIS — R07.9: ICD-10-CM

## 2020-07-11 DIAGNOSIS — R30.0: Primary | ICD-10-CM

## 2020-07-11 DIAGNOSIS — R53.1: ICD-10-CM

## 2020-07-11 LAB
ADD MANUAL DIFF / SLIDE REVIEW: NO
ALBUMIN SERPL-MCNC: 3.2 G/DL (ref 3.5–5)
ALBUMIN/GLOB SERPL: 1.1 {RATIO} (ref 1–2.8)
ALP SERPL-CCNC: 121 U/L (ref 38–126)
ALT SERPL-CCNC: 47 IU/L (ref ?–50)
AMMONIA PLAS-SCNC: 97 UMOL/L (ref 9–30)
ANISOCYTOSIS BLD QL: (no result)
BUN SERPL-MCNC: 27 MG/DL (ref 9–20)
CALCIUM SERPL-MCNC: 9.6 MG/DL (ref 8.4–10.2)
CHLORIDE SERPL-SCNC: 105 MMOL/L (ref 98–107)
CK SERPL-CCNC: 28 U/L (ref 55–170)
CKMB % RELATIVE INDEX: (no result) % (ref 1.5–5)
CO2 SERPL-SCNC: 22 MMOL/L (ref 22–32)
ESTIMATED GLOMERULAR FILT RATE: > 60 ML/MIN (ref 60–?)
GLOBULIN SER CALC-MCNC: 2.8 G/DL (ref 1.7–4.1)
GLUCOSE SERPL-MCNC: 283 MG/DL (ref 80–110)
HEMATOCRIT: 32.6 % (ref 41–53)
HEMOGLOBIN: 11.4 G/DL (ref 13.5–17.5)
HEMOLYSIS: < 15 (ref 0–50)
INR PPP: 1.3 (ref 0.9–1.3)
LYMPHOCYTES # SPEC AUTO: 900 /UL (ref 1100–4500)
MCV RBC: 87 FL (ref 80–100)
MEAN CORPUSCULAR HEMOGLOBIN: 30.4 PG (ref 26–34)
MEAN CORPUSCULAR HGB CONC: 35 % (ref 30–36)
PLATELET COUNT: 59 X10^3/UL (ref 150–400)
POIKILOCYTOSIS: (no result)
POTASSIUM SERPL-SCNC: 4.3 MMOL/L (ref 3.4–5.1)
PROT SERPL-MCNC: 6 G/DL (ref 6.3–8.2)
PROTHROMBIN TIME: 14.7 SECONDS (ref 10.1–12.7)
PTT PARTIAL THROMBOPLASTIN TIM: 29 SECONDS (ref 26.4–36.2)
SODIUM SERPL-SCNC: 134 MMOL/L (ref 137–145)
TROPONIN I SERPL-MCNC: < 0.012 NG/ML (ref 0.01–0.03)

## 2020-07-11 PROCEDURE — 85730 THROMBOPLASTIN TIME PARTIAL: CPT

## 2020-07-11 PROCEDURE — 70450 CT HEAD/BRAIN W/O DYE: CPT

## 2020-07-11 PROCEDURE — 36415 COLL VENOUS BLD VENIPUNCTURE: CPT

## 2020-07-11 PROCEDURE — 81003 URINALYSIS AUTO W/O SCOPE: CPT

## 2020-07-11 PROCEDURE — 51798 US URINE CAPACITY MEASURE: CPT

## 2020-07-11 PROCEDURE — 71045 X-RAY EXAM CHEST 1 VIEW: CPT

## 2020-07-11 PROCEDURE — 85610 PROTHROMBIN TIME: CPT

## 2020-07-11 PROCEDURE — 82140 ASSAY OF AMMONIA: CPT

## 2020-07-11 PROCEDURE — 93005 ELECTROCARDIOGRAM TRACING: CPT

## 2020-07-11 PROCEDURE — 99284 EMERGENCY DEPT VISIT MOD MDM: CPT

## 2020-07-11 PROCEDURE — 87086 URINE CULTURE/COLONY COUNT: CPT

## 2020-07-11 PROCEDURE — 85025 COMPLETE CBC W/AUTO DIFF WBC: CPT

## 2020-07-11 PROCEDURE — 84484 ASSAY OF TROPONIN QUANT: CPT

## 2020-07-11 PROCEDURE — 82550 ASSAY OF CK (CPK): CPT

## 2020-07-11 PROCEDURE — 80053 COMPREHEN METABOLIC PANEL: CPT

## 2020-07-11 NOTE — DI.RAD.S_ITS
PROCEDURE:  XR CHEST 1V  
   
INDICATIONS:  weakness  
   
TECHNIQUE:  One view of the chest was acquired.    
   
COMPARISON:  [facility], CT, CT CHEST W CON, 12/31/2019, 11:49.  [facility],   
CR, XR CHEST 1V, 7/04/2020, 16:54.  
   
FINDINGS:    
   
Surgical changes and devices:  None.    
   
Lungs and pleura: There is a small right-sided pleural effusion.  No focal pulmonary   
consolidation is evident.  There may be a small rounded nodule within the right mid lung.   
 No pneumothorax is evident.  
   
Mediastinum:  Mediastinal contours appear normal.  Heart size is normal.    
   
Bones and chest wall:  No suspicious bony lesions.  Overlying soft tissues appear   
unremarkable.    
   
IMPRESSION:   
1.  Small right-sided pleural effusion.  
2.  Questionable right-sided pulmonary nodule.  A CT of the chest is recommended, when   
clinically appropriate.  
   
   
   
Dictated by: Andreas Contreras M.D. on 7/11/2020 at 12:43       
Approved by: Andreas Contreras M.D. on 7/11/2020 at 12:46 [FreeTextEntry1] : I saw this patient in the office today.\par \par As you recall he has a history of dementia.\par His difficulty has been more for short-term memory than long-term memory.\par He began having memory difficulty around 2013. I had first seen him for this in 2014. He had already been started on Exelon a week or so before his visit here.\par His memory difficulty gradually progressed. I had added Namenda.\par The cognitive difficulties had persisted ever since.\par \par His wife had reported that he is started to have difficulty with activities of daily living including dressing and hygiene.\par \par His wife reported that his insurance is not going to be covering Exelon after January 1 of 2019.\par \par

## 2020-07-11 NOTE — ED.MALEGU
"HPI - Male Genitourinary
<Columba DiggsMAINOR - Last Filed: 07/11/20 18:37>
General
Chief complaint: Urogenital-Male
Stated complaint: UTI symptoms
Time Seen by Provider: 07/11/20 12:19
Source: patient
Mode of arrival: Ambulatory
Limitations: no limitations
History of Present Illness
HPI Narrative: 74yo male with a history of liver cirrhosis, recent TIPs procedure on 06/08/2020 performed at , DMII, and hepatic encephalopathy, presents to the emergency department today for increasing dysuria over the past 2 days and weakness.  
Patient was admitted on 06/28 he had [facility] for acute encephalopathy in UTI from indwelling catheter, he was then discharged and admitted again on 07/04 for acute hepatic encephalopathy, patient was discharged on 07/07.  He states after 
discharge he was feeling much better and was able to go for walks as usual.  However, over the past few days he has noticed increasing weakness and dysuria.  Patient also reports over the past month he has felt ?wobbly ?.  Patient states that the 
ascites and his abdomen has improved as well as his shortness of breath over the past few weeks.
Patient has a procedure scheduled for a colon resection for removal of a large follow-up in the next week at [facility].  He has an appointment on Monday to see a surgeon and a hepatologist. 
Patient is concern for a urinary tract infection like he has had in the past.  He had his catheter removed a few weeks ago.
Patient denies any chest pain, fevers, shortness of breath, nausea, vomiting, diarrhea, abdominal pain, abdominal swelling, falls, or any other concerns.
He states he feels that his ?liver problems  are better under control with increased lactulose.
Related Data
Home Medications

 Medication  Instructions  Recorded  Confirmed
tamsulosin [Flomax] 0.4 mg PO QDAY #0 05/01/12 07/04/20
furosemide 40 mg PO BID 01/15/20 07/04/20
spironolactone 50 mg PO BID 01/15/20 07/04/20

Previous Rx's

 Medication  Instructions  Recorded
blood-glucose meter [Glucocard 01 #1 each 06/28/20
Meter]  
metformin 500 mg PO BID #60 tab 06/28/20
bisacodyl 10 mg NV DAILY PRN #10 ea 07/07/20
finasteride 5 mg PO BEDTIME #30 tab 07/07/20
lactulose 45 gram PO Q2-3H PRN #3000 ml 07/07/20
rifaximin [Xifaxan] 550 mg PO BID #60 tab 07/07/20
ropinirole [Requip] 0.25 mg PO BEDTIME #30 tab 07/07/20


Allergies

Allergy/AdvReac Type Severity Reaction Status Date / Time
No Known Drug Allergies Allergy   Verified 07/11/20 12:33



Review of Systems
<MAINOR Palafox - Last Filed: 07/11/20 18:37>
Review of Systems
Narrative: REVIEW OF SYSTEMS:

GENERAL: Denies fever or chills. 

HENT: No head trauma. 

CARDIOVASCULAR: No chest pain. 

RESPIRATORY: No shortness of breath or cough. 

GASTROINTESTINAL:  No nausea, vomiting, or abdominal pain, see HPI. 

GENITOURINARY: Reports dysuria, See HPI. 

MUSCULOSKELETAL: No trauma. 

INTEGUMENTARY: No rash, lesions, or pruritus.

NEURO: No memory loss or confusion.  Reports ?wobbly gait for over a month . 





Patient History
<MAINOR Palafox - Last Filed: 07/11/20 18:37>
Medical History (Reviewed 07/11/20 @ 18:28 by MAINOR Palafox)

Ascites (Inactive)
Cirrhosis (Inactive)
Gallstones (Inactive)
Hepatic encephalopathy (Acute)
MCCOY (nonalcoholic steatohepatitis) (Acute)
Neutropenia (Resolved)
Splenic mass (Inactive)
Thrombocytopenia (Chronic)
Urinary retention (Acute)


Surgical History (Reviewed 07/11/20 @ 18:28 by MAINOR Palafox)

S/P TIPS (transjugular intrahepatic portosystemic shunt) (Acute)


Family History (Reviewed 07/11/20 @ 18:28 by MAINOR Palafox)
Mother
 Pancreatic cancer
Father
 Colon cancer


Social History (Reviewed 07/11/20 @ 18:28 by MAINOR Palafox)
household members:  spouse 
Smoking Status:  Former smoker 


Smoking Status: Former smoker
alcohol intake frequency: holidays/special occasions only
Substance Use Type: does not use

Exam
<Columba Pike
[id number]|2020-07-11 13:19:00|2020-07-11 14:27:00|DI.CT.S_ITS|[facility]|Imaging|[id number]|"PROCEDURE:  CT HEAD/BRAIN WO CON

## 2020-08-06 ENCOUNTER — HOSPITAL ENCOUNTER (OUTPATIENT)
Age: 75
End: 2020-08-06
Payer: MEDICARE

## 2020-08-06 VITALS — BODY MASS INDEX: 25 KG/M2

## 2020-08-06 DIAGNOSIS — J90: Primary | ICD-10-CM

## 2020-08-06 LAB
INR PPP: 1.3 (ref 0.9–1.3)
PLATELET COUNT: 159 X10^3/UL (ref 150–400)
PROTHROMBIN TIME: 15.1 SECONDS (ref 10.1–12.7)

## 2020-08-06 PROCEDURE — 85049 AUTOMATED PLATELET COUNT: CPT

## 2020-08-06 PROCEDURE — 36415 COLL VENOUS BLD VENIPUNCTURE: CPT

## 2020-08-06 PROCEDURE — 85610 PROTHROMBIN TIME: CPT

## 2020-08-10 ENCOUNTER — HOSPITAL ENCOUNTER (OUTPATIENT)
Age: 75
End: 2020-08-10
Payer: MEDICARE

## 2020-08-10 VITALS — BODY MASS INDEX: 25 KG/M2

## 2020-08-10 DIAGNOSIS — J90: Primary | ICD-10-CM

## 2020-08-10 DIAGNOSIS — J98.11: ICD-10-CM

## 2020-08-10 PROCEDURE — 71045 X-RAY EXAM CHEST 1 VIEW: CPT

## 2020-08-10 PROCEDURE — 32555 ASPIRATE PLEURA W/ IMAGING: CPT

## 2020-08-10 NOTE — DI.US.S_ITS
PROCEDURE:  US THORACENTESIS  
   
INDICATIONS:  Pleural effusion, not elsewhere classified  
   
TECHNIQUE:    
The indications, alternatives, benefits, risks, and complications of the procedure were   
explained to the patient.  Written informed consent was obtained and placed in the chart.   
 The chest was examined sonographically, and an appropriate site was chosen for   
thoracentesis.  The skin was prepared and draped in the usual sterile fashion, and 1%   
lidocaine was infiltrated from the skin down through the pleural surface.  A 19-gauge   
catheter-covered needle was then introduced into the pleural space, the catheter was   
advanced and the needle was withdrawn, and thereafter pleural fluid was aspirated.  The   
catheter was then removed and a dressing was applied.    
   
COMPARISON:  [facility],  THORACENTESIS, 1/13/2020, 15:44.  
   
FINDINGS:    
Access site:  Right hemithorax.    
Needle:  One-Step centesis catheter with introducer needle.    
Fluid volume and description:  1500 cc of serosanguineous fluid  
Fluid sent for diagnostic testing:  Not requested.  
Medications:  1% lidocaine for local anaesthesia.    
Complications:  None; post-procedural chest radiograph is pending to assess for   
pneumothorax.    
   
IMPRESSION:  Successful ultrasound-guided thoracentesis.    
   
   
Dictated by: Kvng Uribe M.D. on 8/10/2020 at 14:11       
Approved by: Kvng Uribe M.D. on 8/10/2020 at 14:12

## 2020-08-10 NOTE — DI.RAD.S_ITS
PROCEDURE:  XR CHEST 1V  
   
INDICATIONS:  post thoracentesis  
   
TECHNIQUE:  One view of the chest was acquired.    
   
COMPARISON:  [facility], CR, XR CHEST 1V, 7/11/2020, 13:31.  
   
FINDINGS:    
   
Surgical changes and devices:  None.    
   
Moderate to large amount of residual right pleural effusion.  No pneumothorax.  Left lung   
appears clear.  
Mediastinum:  Mediastinal contours appear normal.  Heart size is normal.    
   
Bones and chest wall:  No suspicious bony lesions.  Overlying soft tissues appear   
unremarkable.    
   
IMPRESSION:    
   
Residual moderate to large right pleural effusion with adjacent atelectasis.  No   
pneumothorax, status post right ultrasound-guided thoracentesis.  
   
   
   
   
Dictated by: Kvng Uribe M.D. on 8/10/2020 at 10:55       
Approved by: Kvng Uribe M.D. on 8/10/2020 at 10:57

## 2020-08-18 ENCOUNTER — HOSPITAL ENCOUNTER (OUTPATIENT)
Age: 75
End: 2020-08-18
Payer: MEDICARE

## 2020-08-18 VITALS — BODY MASS INDEX: 25 KG/M2

## 2020-08-18 DIAGNOSIS — J02.9: Primary | ICD-10-CM

## 2020-08-18 PROCEDURE — 87070 CULTURE OTHR SPECIMN AEROBIC: CPT

## 2020-08-31 ENCOUNTER — HOSPITAL ENCOUNTER (OUTPATIENT)
Age: 75
End: 2020-08-31
Payer: MEDICARE

## 2020-08-31 VITALS — BODY MASS INDEX: 25 KG/M2

## 2020-08-31 DIAGNOSIS — K75.81: ICD-10-CM

## 2020-08-31 DIAGNOSIS — J90: Primary | ICD-10-CM

## 2020-08-31 DIAGNOSIS — E08.9: ICD-10-CM

## 2020-08-31 LAB
ALBUMIN SERPL-MCNC: 3.7 G/DL (ref 3.5–5)
ALBUMIN/GLOB SERPL: 1.1 {RATIO} (ref 1–2.8)
ALP SERPL-CCNC: 235 U/L (ref 38–126)
ALT SERPL-CCNC: 104 IU/L (ref ?–50)
BUN SERPL-MCNC: 15 MG/DL (ref 9–20)
CALCIUM SERPL-MCNC: 9.4 MG/DL (ref 8.4–10.2)
CHLORIDE SERPL-SCNC: 105 MMOL/L (ref 98–107)
CO2 SERPL-SCNC: 24 MMOL/L (ref 22–32)
ESTIMATED GLOMERULAR FILT RATE: > 60 ML/MIN (ref 60–?)
GLOBULIN SER CALC-MCNC: 3.3 G/DL (ref 1.7–4.1)
GLUCOSE SERPL-MCNC: 215 MG/DL (ref 80–110)
HBA1C MFR BLD: 5.7 % (ref 4–6)
HEMOLYSIS: < 15 (ref 0–50)
POTASSIUM SERPL-SCNC: 4.6 MMOL/L (ref 3.4–5.1)
PROT SERPL-MCNC: 7 G/DL (ref 6.3–8.2)
SODIUM SERPL-SCNC: 136 MMOL/L (ref 137–145)

## 2020-08-31 PROCEDURE — 80053 COMPREHEN METABOLIC PANEL: CPT

## 2020-08-31 PROCEDURE — 71045 X-RAY EXAM CHEST 1 VIEW: CPT

## 2020-08-31 PROCEDURE — 36415 COLL VENOUS BLD VENIPUNCTURE: CPT

## 2020-08-31 PROCEDURE — 83036 HEMOGLOBIN GLYCOSYLATED A1C: CPT

## 2020-08-31 PROCEDURE — 32555 ASPIRATE PLEURA W/ IMAGING: CPT

## 2020-08-31 NOTE — DI.RAD.S_ITS
PROCEDURE:  XR CHEST 1V  
   
INDICATIONS:  POST THORACENTESIS  
   
TECHNIQUE:  One view of the chest was acquired.    
   
COMPARISON:  [facility], CR, XR CHEST 1V, 8/10/2020, 9:42.  
   
FINDINGS:    
   
Surgical changes and devices:  None.    
   
Lungs and pleura:  Interval decrease in size of now moderate sized right pleural effusion   
status post thoracentesis.  No pneumothorax seen.  Compressive atelectasis of the lower   
lung zone.  Left lung is clear.    
   
   
Mediastinum:  Mediastinal contours appear normal.  Heart size is normal.    
   
Bones and chest wall:  No suspicious bony lesions.  Overlying soft tissues appear   
unremarkable.    
   
IMPRESSION:    
   
Moderate-sized residual right pleural effusion status post thoracentesis.  
No pneumothorax seen.    
   
Dictated by: Artur Mclaughlin M.D. on 8/31/2020 at 13:47       
Approved by: Artur Mclaughlin M.D. on 8/31/2020 at 13:48

## 2020-08-31 NOTE — DI.US.S_ITS
PROCEDURE:  US THORACENTESIS  
   
INDICATIONS:  PLEURAL EFFUSION  
   
TECHNIQUE:    
The indications, alternatives, benefits, risks, and complications of the procedure were   
explained to the patient.  Written informed consent was obtained and placed in the chart.   
 The chest was examined sonographically, and an appropriate site was chosen for   
thoracentesis.  The skin was prepared and draped in the usual sterile fashion, and 1%   
lidocaine was infiltrated from the skin down through the pleural surface.  A 19-gauge   
catheter-covered needle was then introduced into the pleural space, the catheter was   
advanced and the needle was withdrawn, and thereafter pleural fluid was aspirated.  The   
catheter was then removed and a dressing was applied.    
   
COMPARISON:  [facility],  THORACENTESIS, 8/10/2020, 10:21.  
   
FINDINGS:    
Access site:  Right hemithorax.    
Needle:  One-Step centesis catheter with introducer needle.    
Fluid volume and description:  1500 mL of dark cj colored pleural effusion.  
Fluid sent for diagnostic testing:  Fluid was sent to cytology for evaluation.  
Medications:  1% lidocaine for local anaesthesia.    
Complications:  None; post-procedural chest radiograph is pending to assess for   
pneumothorax.    
   
IMPRESSION:  Successful ultrasound-guided thoracentesis.    
   
   
Dictated by: Artur Mclaughlin M.D. on 8/31/2020 at 17:17       
Approved by: Artur Mclaughlin M.D. on 8/31/2020 at 17:18

## 2020-09-17 ENCOUNTER — HOSPITAL ENCOUNTER
Age: 75
End: 2020-09-17
Payer: MEDICARE

## 2020-09-17 VITALS — BODY MASS INDEX: 25 KG/M2

## 2020-09-17 DIAGNOSIS — N20.9: ICD-10-CM

## 2020-09-17 DIAGNOSIS — R39.9: Primary | ICD-10-CM

## 2020-09-17 DIAGNOSIS — R39.12: ICD-10-CM

## 2020-09-17 PROCEDURE — 87185 SC STD ENZYME DETCJ PER NZM: CPT

## 2020-09-17 PROCEDURE — 87186 SC STD MICRODIL/AGAR DIL: CPT

## 2020-09-17 PROCEDURE — 87077 CULTURE AEROBIC IDENTIFY: CPT

## 2020-09-17 PROCEDURE — 87086 URINE CULTURE/COLONY COUNT: CPT

## 2020-09-18 ENCOUNTER — HOSPITAL ENCOUNTER (OUTPATIENT)
Age: 75
End: 2020-09-18
Payer: MEDICARE

## 2020-09-18 VITALS — BODY MASS INDEX: 25 KG/M2

## 2020-09-18 DIAGNOSIS — R39.12: ICD-10-CM

## 2020-09-18 DIAGNOSIS — N21.0: ICD-10-CM

## 2020-09-18 DIAGNOSIS — R35.0: ICD-10-CM

## 2020-09-18 DIAGNOSIS — J90: ICD-10-CM

## 2020-09-18 DIAGNOSIS — K76.9: ICD-10-CM

## 2020-09-18 DIAGNOSIS — R39.9: ICD-10-CM

## 2020-09-18 DIAGNOSIS — N20.9: Primary | ICD-10-CM

## 2020-09-18 DIAGNOSIS — K74.60: ICD-10-CM

## 2020-09-18 DIAGNOSIS — Z90.49: ICD-10-CM

## 2020-09-18 DIAGNOSIS — J98.11: ICD-10-CM

## 2020-09-18 DIAGNOSIS — R16.1: ICD-10-CM

## 2020-09-18 PROCEDURE — 74176 CT ABD & PELVIS W/O CONTRAST: CPT

## 2020-09-18 NOTE — DI.CT.S_ITS
PROCEDURE:  CT ABDOMEN PELVIS WO CON  
   
INDICATIONS:  Urinary calculus, unspecified  
   
TECHNIQUE:    
Noncontrast 5 mm thick sections acquired from the diaphragms to the symphysis.  5 mm   
thick coronal and sagittal reformats were then performed.  For radiation dose reduction,   
the following was used:  automated exposure control, adjustment of mA and/or kV according   
to patient size.    
   
COMPARISON:  [facility], CT, CT ABDOMEN PELVIS W CON, 7/04/2020, 19:51.  [facility], CR, XR CHEST 1V, 8/10/2020, 9:42.  [facility], CR, XR CHEST 1V, 8/31/2020,   
13:18.  
   
FINDINGS:    
Image quality:  Excellent.    
   
Lung bases:  Lung bases are clear on the left but show 5 right lung base atelectasis   
associated with a moderate posterior layering right pleural effusion.  Heart size is   
normal.    
   
Urinary system:  Both kidneys are normal in size.  No kidney stones.  No hydronephrosis   
or perinephric fat stranding.  Both ureters appear non-dilated throughout their expected   
courses.  Bladder wall thickness is normal; no calcified bladder stones.    
   
Other solid organs:  Liver is small in size and nodular in margination, and demonstrates   
an expandable TIPS stent.  Note is made within the left lateral hepatic segment of a   
anterior ovoid hypo dense 1.5 x 2.1 cm abnormality potentially on early manifestation of   
hepatic neoplasm.  Gallbladder has been resected .  Pancreas is normal in contours.    
Spleen is enlarged in size at 13.8 cm craniocaudad.  No adrenal nodules.    
   
Peritoneum and bowel:  Unenhanced bowel loops demonstrate normal wall thickness and   
caliber.  No free fluid or air.    
   
Nodes and vessels:  No retroperitoneal or mesenteric adenopathy by size criteria.  Aorta   
and inferior vena cava are normal in caliber.    
   
Abdominal wall:  No ventral hernias.    
   
Pelvis:  No free pelvic fluid.  No inguinal hernias or adenopathy.  Within the bladder   
lumen on the right there are 3 layering calculi, each measuring only approximately 2.5 mm   
in diameter.  
   
Bones:  No suspicious bony lesions.  No vertebral body compression fractures.    
   
IMPRESSION:    
   
1.  There is an unexpected finding of a mass-like lesion within the anterior left lateral   
hepatic segment in this patient with cirrhosis, mild splenomegaly, and a TIPS shunt in   
place.  Hepatocellular carcinoma is a potential etiology of this finding, and follow-up   
by hepatic neoplasm protocol contrast-enhanced MR scanning is recommended.  
   
2.  Layering small bladder calculi are present to the right of midline within the bladder   
lumen.  Incidental note is made of prior cholecystectomy.  
   
3.  Right lung base pleural effusion, secondary atelectasis right lower lobe.    
   
Dictated by: Armando Easton M.D. on 9/18/2020 at 10:42       
Approved by: Armando Easton M.D. on 9/18/2020 at 10:51

## 2020-11-19 ENCOUNTER — HOSPITAL ENCOUNTER (OUTPATIENT)
Age: 75
End: 2020-11-19
Payer: MEDICARE

## 2020-11-19 VITALS — BODY MASS INDEX: 25 KG/M2

## 2020-11-19 DIAGNOSIS — N13.8: ICD-10-CM

## 2020-11-19 DIAGNOSIS — N39.0: Primary | ICD-10-CM

## 2020-11-19 DIAGNOSIS — T83.511A: ICD-10-CM

## 2020-11-19 DIAGNOSIS — Z87.898: ICD-10-CM

## 2020-11-19 DIAGNOSIS — N40.1: ICD-10-CM

## 2020-11-19 PROCEDURE — 51798 US URINE CAPACITY MEASURE: CPT

## 2020-11-19 PROCEDURE — 81002 URINALYSIS NONAUTO W/O SCOPE: CPT

## 2020-11-19 PROCEDURE — 87086 URINE CULTURE/COLONY COUNT: CPT

## 2020-11-19 PROCEDURE — 99215 OFFICE O/P EST HI 40 MIN: CPT

## 2020-11-19 PROCEDURE — 87185 SC STD ENZYME DETCJ PER NZM: CPT

## 2020-11-19 PROCEDURE — 87186 SC STD MICRODIL/AGAR DIL: CPT

## 2020-11-19 PROCEDURE — 87077 CULTURE AEROBIC IDENTIFY: CPT

## 2020-12-22 ENCOUNTER — HOSPITAL ENCOUNTER (OUTPATIENT)
Age: 75
End: 2020-12-22
Payer: MEDICARE

## 2020-12-22 VITALS — BODY MASS INDEX: 25 KG/M2

## 2020-12-22 DIAGNOSIS — N39.0: Primary | ICD-10-CM

## 2020-12-22 DIAGNOSIS — Z87.898: ICD-10-CM

## 2020-12-22 LAB
APPEARANCE UR: CLEAR
BILIRUBIN URINE UA: NEGATIVE
COLOR UR: YELLOW
GLUCOSE URINE UA: (no result) G/DL
HGB UR QL: NEGATIVE
KETONES URINE UA: NEGATIVE
LEUKOCYTE ESTERASE URINE UA: NEGATIVE
NITRITE URINE UA: NEGATIVE
PH UR: 6.5 [PH] (ref 4.5–8)
PROTEIN URINE UA: NEGATIVE
SP GR UR: 1.01 (ref 1–1.03)
UROBILINOGEN UR QL: 0.2 E.U./DL

## 2020-12-22 PROCEDURE — 81003 URINALYSIS AUTO W/O SCOPE: CPT

## 2021-03-27 NOTE — CM.DPC
DCP cont:

Faxed discharge records ( resume order,  discharge report, 7/6/20 progress note and discharge summary) to [facility] at fax # [phone].  Fax confirmation scanned in.

Marylin Choi, Care  show

## 2021-04-28 ENCOUNTER — HOSPITAL ENCOUNTER (OUTPATIENT)
Age: 76
End: 2021-04-28
Payer: MEDICARE

## 2021-04-28 VITALS — BODY MASS INDEX: 25 KG/M2

## 2021-04-28 DIAGNOSIS — R97.20: Primary | ICD-10-CM

## 2021-04-28 LAB — PSA SERPL-MCNC: 2.16 NG/ML (ref 0.1–4)

## 2021-04-28 PROCEDURE — 36415 COLL VENOUS BLD VENIPUNCTURE: CPT

## 2021-04-28 PROCEDURE — 84153 ASSAY OF PSA TOTAL: CPT

## 2021-08-02 ENCOUNTER — HOSPITAL ENCOUNTER (OUTPATIENT)
Age: 76
End: 2021-08-02
Payer: MEDICARE

## 2021-08-02 VITALS — BODY MASS INDEX: 25 KG/M2

## 2021-08-02 DIAGNOSIS — Z20.822: ICD-10-CM

## 2021-08-02 DIAGNOSIS — Z01.812: Primary | ICD-10-CM

## 2021-08-02 PROCEDURE — 87635 SARS-COV-2 COVID-19 AMP PRB: CPT

## 2021-08-03 ENCOUNTER — HOSPITAL ENCOUNTER (OUTPATIENT)
Age: 76
Discharge: HOME | End: 2021-08-03
Payer: MEDICARE

## 2021-08-03 VITALS
TEMPERATURE: 97.3 F | RESPIRATION RATE: 11 BRPM | DIASTOLIC BLOOD PRESSURE: 54 MMHG | SYSTOLIC BLOOD PRESSURE: 133 MMHG | OXYGEN SATURATION: 95 % | HEART RATE: 50 BPM

## 2021-08-03 VITALS
DIASTOLIC BLOOD PRESSURE: 49 MMHG | RESPIRATION RATE: 97 BRPM | OXYGEN SATURATION: 11 % | SYSTOLIC BLOOD PRESSURE: 148 MMHG | TEMPERATURE: 97.88 F | HEART RATE: 52 BPM

## 2021-08-03 VITALS
OXYGEN SATURATION: 97 % | DIASTOLIC BLOOD PRESSURE: 52 MMHG | HEART RATE: 50 BPM | RESPIRATION RATE: 11 BRPM | TEMPERATURE: 97.8 F | SYSTOLIC BLOOD PRESSURE: 147 MMHG

## 2021-08-03 VITALS
HEART RATE: 53 BPM | SYSTOLIC BLOOD PRESSURE: 161 MMHG | OXYGEN SATURATION: 97 % | DIASTOLIC BLOOD PRESSURE: 61 MMHG | TEMPERATURE: 97.6 F | RESPIRATION RATE: 14 BRPM

## 2021-08-03 VITALS
RESPIRATION RATE: 12 BRPM | SYSTOLIC BLOOD PRESSURE: 157 MMHG | TEMPERATURE: 97.16 F | DIASTOLIC BLOOD PRESSURE: 74 MMHG | HEART RATE: 60 BPM | OXYGEN SATURATION: 95 %

## 2021-08-03 VITALS
RESPIRATION RATE: 9 BRPM | TEMPERATURE: 97.52 F | SYSTOLIC BLOOD PRESSURE: 137 MMHG | HEART RATE: 48 BPM | OXYGEN SATURATION: 96 % | DIASTOLIC BLOOD PRESSURE: 54 MMHG

## 2021-08-03 VITALS — BODY MASS INDEX: 25 KG/M2 | BODY MASS INDEX: 25.6 KG/M2

## 2021-08-03 DIAGNOSIS — Z85.038: ICD-10-CM

## 2021-08-03 DIAGNOSIS — K43.9: ICD-10-CM

## 2021-08-03 DIAGNOSIS — Z12.11: Primary | ICD-10-CM

## 2021-08-03 DIAGNOSIS — K57.30: ICD-10-CM

## 2021-08-03 DIAGNOSIS — D12.6: ICD-10-CM

## 2021-08-03 DIAGNOSIS — D12.4: ICD-10-CM

## 2021-08-03 DIAGNOSIS — K74.69: ICD-10-CM

## 2021-08-03 PROCEDURE — 0DJD8ZZ INSPECTION OF LOWER INTESTINAL TRACT, VIA NATURAL OR ARTIFICIAL OPENING ENDOSCOPIC: ICD-10-PCS | Performed by: INTERNAL MEDICINE

## 2021-08-03 PROCEDURE — 45380 COLONOSCOPY AND BIOPSY: CPT

## 2021-08-03 PROCEDURE — 45385 COLONOSCOPY W/LESION REMOVAL: CPT

## 2022-07-11 ENCOUNTER — HOSPITAL ENCOUNTER (OUTPATIENT)
Age: 77
End: 2022-07-11
Payer: MEDICARE

## 2022-07-11 VITALS — BODY MASS INDEX: 25 KG/M2

## 2022-07-11 DIAGNOSIS — C22.0: Primary | ICD-10-CM

## 2022-07-11 DIAGNOSIS — Z90.49: ICD-10-CM

## 2022-07-11 DIAGNOSIS — K86.9: ICD-10-CM

## 2022-07-11 DIAGNOSIS — J90: ICD-10-CM

## 2022-07-11 PROCEDURE — 74170 CT ABD WO CNTRST FLWD CNTRST: CPT

## 2022-09-07 ENCOUNTER — HOSPITAL ENCOUNTER (OUTPATIENT)
Age: 77
End: 2022-09-07
Payer: MEDICARE

## 2022-09-07 VITALS — BODY MASS INDEX: 25 KG/M2

## 2022-09-07 DIAGNOSIS — Z20.822: Primary | ICD-10-CM

## 2023-02-06 NOTE — PATH_ITS
Note  
[facility] Accession Number: [id number]  
   TESTS               RESULT  FLAG  UNITS    REF RANGE  LAB  
------------------------------------------------------------  
   Clinician Provided Cytology Information  
   No. of containers..01 Other (Miscellaneous)  
                                                          01  
   RIGHT PLEURAL EFFUSI  
Clinician ICD10:                                          01  
   J90  
DIAGNOSIS:                                                01  
   RIGHT PLEURAL EFFUSION, ASPIRATION.  
   NEGATIVE FOR MALIGNANT CELLS.  
   FEW MESOTHELIAL CELLS AND BACKGROUND LYMPHOCYTES ALSO PRESENT.  
   THIS INTERPRETATION INCLUDES EVALUATION OF A CELL BLOCK.  
Pathologist ICD10:                                        01  
   J90  
                                                          01  
   Ramesh Ritter MD, Pathologist  
   NPI- [id number]  
                                                          01  
   Baldemar Coates, Cytotechnologist ([facility])  
                                                          01  
   80 CC, ORANGE, HAZY  
   /LCS  01/15/2020  1052 Local  
  
------------------------------------------------------------  
    FLAG LEGEND:  
    L-Low Normal,H-High Normal,LL-Alert Low,HH-Alert High  
    <-Panic Low,>-Panic High,A-Abnormal,AA-Critical Abnormal  
------------------------------------------------------------  
  
Performed at:  
01 =Z    [facility]  
   [address], [place]  [address]  
   Daniel L Toweill MD, Phone: [phone]  
***Performed at:  01  
   [facility]  
   [address], [place], [place]  [id number]  
   MD Daniel Toweill MD Phone:  [phone] normal performance

## 2023-04-05 ENCOUNTER — HOSPITAL ENCOUNTER (OUTPATIENT)
Age: 78
End: 2023-04-05
Payer: MEDICARE

## 2023-04-05 VITALS — BODY MASS INDEX: 25 KG/M2

## 2023-04-05 DIAGNOSIS — J90: ICD-10-CM

## 2023-04-05 DIAGNOSIS — R16.1: ICD-10-CM

## 2023-04-05 DIAGNOSIS — K44.9: ICD-10-CM

## 2023-04-05 DIAGNOSIS — J98.11: ICD-10-CM

## 2023-04-05 DIAGNOSIS — N28.1: ICD-10-CM

## 2023-04-05 DIAGNOSIS — D73.9: ICD-10-CM

## 2023-04-05 DIAGNOSIS — K43.9: ICD-10-CM

## 2023-04-05 DIAGNOSIS — K86.2: ICD-10-CM

## 2023-04-05 DIAGNOSIS — C22.0: Primary | ICD-10-CM

## 2023-04-05 PROCEDURE — 74183 MRI ABD W/O CNTR FLWD CNTR: CPT

## 2023-04-12 ENCOUNTER — HOSPITAL ENCOUNTER (OUTPATIENT)
Age: 78
End: 2023-04-12
Payer: MEDICARE

## 2023-04-12 VITALS — BODY MASS INDEX: 25 KG/M2

## 2023-04-12 DIAGNOSIS — R94.6: ICD-10-CM

## 2023-04-12 DIAGNOSIS — K74.60: ICD-10-CM

## 2023-04-12 DIAGNOSIS — Z95.828: ICD-10-CM

## 2023-04-12 DIAGNOSIS — E06.4: ICD-10-CM

## 2023-04-12 DIAGNOSIS — K74.69: ICD-10-CM

## 2023-04-12 DIAGNOSIS — C22.0: Primary | ICD-10-CM

## 2023-04-12 LAB
ADD MANUAL DIFF / SLIDE REVIEW: NO
ALBUMIN SERPL-MCNC: 2.6 G/DL (ref 3.5–5)
ALBUMIN/GLOB SERPL: 0.9 {RATIO} (ref 1–2.8)
ALP SERPL-CCNC: 106 U/L (ref 38–126)
ALT SERPL-CCNC: 43 IU/L (ref ?–50)
AMMONIA PLAS-SCNC: 60 UMOL/L (ref 9–30)
BUN SERPL-MCNC: 27 MG/DL (ref 9–20)
CALCIUM SERPL-MCNC: 8.5 MG/DL (ref 8.4–10.2)
CHLORIDE SERPL-SCNC: 105 MMOL/L (ref 98–107)
CO2 SERPL-SCNC: 21 MMOL/L (ref 22–32)
ESTIMATED GLOMERULAR FILT RATE: 51 ML/MIN (ref 60–?)
GLOBULIN SER CALC-MCNC: 2.9 G/DL (ref 1.7–4.1)
GLUCOSE SERPL-MCNC: 234 MG/DL (ref 80–110)
HEMATOCRIT: 40.6 % (ref 41–53)
HEMOGLOBIN: 14.3 G/DL (ref 13.5–17.5)
HEMOLYSIS: < 15 (ref 0–50)
INR PPP: 1.5 (ref 0.9–1.3)
LYMPHOCYTES # SPEC AUTO: 500 /UL (ref 1100–4500)
MCV RBC: 101 FL (ref 80–100)
MEAN CORPUSCULAR HEMOGLOBIN: 35.7 PG (ref 26–34)
MEAN CORPUSCULAR HGB CONC: 35.3 % (ref 30–36)
PLATELET COUNT: 50 X10^3/UL (ref 150–400)
POTASSIUM SERPL-SCNC: 5.1 MMOL/L (ref 3.4–5.1)
PROT SERPL-MCNC: 5.5 G/DL (ref 6.3–8.2)
PROT UR-MCNC: < 5 MG/DL (ref 0–12)
PROTHROMBIN TIME: 17 SECONDS (ref 10.1–12.7)
SODIUM SERPL-SCNC: 132 MMOL/L (ref 137–145)
SPECIMEN VOL UR: 3000 ML
TSH SERPL DL<=0.05 MIU/L-ACNC: 2.42 UIU/ML (ref 0.47–4.68)

## 2023-04-12 PROCEDURE — 85025 COMPLETE CBC W/AUTO DIFF WBC: CPT

## 2023-04-12 PROCEDURE — 80048 BASIC METABOLIC PNL TOTAL CA: CPT

## 2023-04-12 PROCEDURE — 84443 ASSAY THYROID STIM HORMONE: CPT

## 2023-04-12 PROCEDURE — 84156 ASSAY OF PROTEIN URINE: CPT

## 2023-04-12 PROCEDURE — 85610 PROTHROMBIN TIME: CPT

## 2023-04-12 PROCEDURE — 80076 HEPATIC FUNCTION PANEL: CPT

## 2023-04-12 PROCEDURE — 36415 COLL VENOUS BLD VENIPUNCTURE: CPT

## 2023-04-12 PROCEDURE — 82105 ALPHA-FETOPROTEIN SERUM: CPT

## 2023-04-12 PROCEDURE — 82140 ASSAY OF AMMONIA: CPT

## 2023-08-19 ENCOUNTER — HOSPITAL ENCOUNTER (EMERGENCY)
Age: 78
Discharge: HOME | End: 2023-08-19
Payer: MEDICARE

## 2023-08-19 VITALS
DIASTOLIC BLOOD PRESSURE: 67 MMHG | OXYGEN SATURATION: 94 % | SYSTOLIC BLOOD PRESSURE: 151 MMHG | RESPIRATION RATE: 19 BRPM | HEART RATE: 69 BPM

## 2023-08-19 VITALS
RESPIRATION RATE: 14 BRPM | SYSTOLIC BLOOD PRESSURE: 156 MMHG | HEART RATE: 70 BPM | DIASTOLIC BLOOD PRESSURE: 68 MMHG | OXYGEN SATURATION: 95 %

## 2023-08-19 VITALS
DIASTOLIC BLOOD PRESSURE: 73 MMHG | HEART RATE: 81 BPM | TEMPERATURE: 97.88 F | OXYGEN SATURATION: 94 % | RESPIRATION RATE: 22 BRPM | SYSTOLIC BLOOD PRESSURE: 171 MMHG

## 2023-08-19 VITALS — OXYGEN SATURATION: 93 % | HEART RATE: 70 BPM | DIASTOLIC BLOOD PRESSURE: 76 MMHG | SYSTOLIC BLOOD PRESSURE: 168 MMHG

## 2023-08-19 VITALS — SYSTOLIC BLOOD PRESSURE: 160 MMHG | HEART RATE: 69 BPM | DIASTOLIC BLOOD PRESSURE: 72 MMHG | OXYGEN SATURATION: 94 %

## 2023-08-19 VITALS
SYSTOLIC BLOOD PRESSURE: 159 MMHG | HEART RATE: 70 BPM | DIASTOLIC BLOOD PRESSURE: 73 MMHG | OXYGEN SATURATION: 94 % | RESPIRATION RATE: 17 BRPM

## 2023-08-19 VITALS — HEART RATE: 70 BPM | OXYGEN SATURATION: 94 % | SYSTOLIC BLOOD PRESSURE: 161 MMHG | DIASTOLIC BLOOD PRESSURE: 72 MMHG

## 2023-08-19 VITALS
RESPIRATION RATE: 22 BRPM | HEART RATE: 67 BPM | SYSTOLIC BLOOD PRESSURE: 159 MMHG | DIASTOLIC BLOOD PRESSURE: 71 MMHG | OXYGEN SATURATION: 94 %

## 2023-08-19 VITALS — RESPIRATION RATE: 16 BRPM | HEART RATE: 67 BPM | OXYGEN SATURATION: 94 %

## 2023-08-19 VITALS — BODY MASS INDEX: 28.2 KG/M2 | BODY MASS INDEX: 25 KG/M2

## 2023-08-19 DIAGNOSIS — Z79.01: ICD-10-CM

## 2023-08-19 DIAGNOSIS — Z20.822: ICD-10-CM

## 2023-08-19 DIAGNOSIS — R06.02: ICD-10-CM

## 2023-08-19 DIAGNOSIS — J90: Primary | ICD-10-CM

## 2023-08-19 LAB
ADD MANUAL DIFF / SLIDE REVIEW: NO
ALBUMIN SERPL-MCNC: 2.1 G/DL (ref 3.5–5)
ALBUMIN/GLOB SERPL: 0.7 {RATIO} (ref 1–2.8)
ALP SERPL-CCNC: 118 U/L (ref 38–126)
ALT SERPL-CCNC: 32 IU/L (ref ?–50)
AMMONIA PLAS-SCNC: 68 UMOL/L (ref 9–30)
BUN SERPL-MCNC: 36 MG/DL (ref 9–20)
CALCIUM SERPL-MCNC: 8 MG/DL (ref 8.4–10.2)
CHLORIDE SERPL-SCNC: 104 MMOL/L (ref 98–107)
CO2 SERPL-SCNC: 18 MMOL/L (ref 22–32)
ESTIMATED GLOMERULAR FILT RATE: 42 ML/MIN (ref 60–?)
FLUAV RNA UPPER RESP QL NAA+PROBE: (no result)
FLUBV RNA UPPER RESP QL NAA+PROBE: (no result)
GLOBULIN SER CALC-MCNC: 2.9 G/DL (ref 1.7–4.1)
GLUCOSE SERPL-MCNC: 191 MG/DL (ref 80–110)
HEMATOCRIT: 35.5 % (ref 41–53)
HEMOGLOBIN: 12.5 G/DL (ref 13.5–17.5)
HEMOLYSIS: < 15 (ref 0–50)
INR PPP: 1.6 (ref 0.9–1.3)
LACTATE SERPL-MCNC: 2.1 MMOL/L (ref 0.7–2.1)
LYMPHOCYTES # SPEC AUTO: 500 /UL (ref 1100–4500)
MCV RBC: 103.7 FL (ref 80–100)
MEAN CORPUSCULAR HEMOGLOBIN: 36.6 PG (ref 26–34)
MEAN CORPUSCULAR HGB CONC: 35.3 % (ref 30–36)
NT-PROBNP SERPL-MCNC: 106 PG/ML (ref ?–450)
PLATELET COUNT: 37 X10^3/UL (ref 150–400)
POTASSIUM SERPL-SCNC: 4.5 MMOL/L (ref 3.4–5.1)
PROT SERPL-MCNC: 5 G/DL (ref 6.3–8.2)
PROTHROMBIN TIME: 18.4 SECONDS (ref 10.1–12.7)
SODIUM SERPL-SCNC: 127 MMOL/L (ref 137–145)
TROPONIN I SERPL-MCNC: < 0.012 NG/ML (ref 0.01–0.03)

## 2023-08-19 PROCEDURE — 85025 COMPLETE CBC W/AUTO DIFF WBC: CPT

## 2023-08-19 PROCEDURE — 36415 COLL VENOUS BLD VENIPUNCTURE: CPT

## 2023-08-19 PROCEDURE — 83880 ASSAY OF NATRIURETIC PEPTIDE: CPT

## 2023-08-19 PROCEDURE — 99284 EMERGENCY DEPT VISIT MOD MDM: CPT

## 2023-08-19 PROCEDURE — 93005 ELECTROCARDIOGRAM TRACING: CPT

## 2023-08-19 PROCEDURE — 84484 ASSAY OF TROPONIN QUANT: CPT

## 2023-08-19 PROCEDURE — 80053 COMPREHEN METABOLIC PANEL: CPT

## 2023-08-19 PROCEDURE — 86850 RBC ANTIBODY SCREEN: CPT

## 2023-08-19 PROCEDURE — 83605 ASSAY OF LACTIC ACID: CPT

## 2023-08-19 PROCEDURE — 86901 BLOOD TYPING SEROLOGIC RH(D): CPT

## 2023-08-19 PROCEDURE — 85610 PROTHROMBIN TIME: CPT

## 2023-08-19 PROCEDURE — 82140 ASSAY OF AMMONIA: CPT

## 2023-08-19 PROCEDURE — 86900 BLOOD TYPING SEROLOGIC ABO: CPT

## 2023-08-19 PROCEDURE — 71045 X-RAY EXAM CHEST 1 VIEW: CPT

## 2023-08-19 PROCEDURE — 0241U: CPT

## 2023-08-19 PROCEDURE — 96374 THER/PROPH/DIAG INJ IV PUSH: CPT

## 2023-08-21 ENCOUNTER — HOSPITAL ENCOUNTER (EMERGENCY)
Age: 78
Discharge: HOME | End: 2023-08-21
Payer: MEDICARE

## 2023-08-21 VITALS
RESPIRATION RATE: 17 BRPM | OXYGEN SATURATION: 93 % | HEART RATE: 65 BPM | SYSTOLIC BLOOD PRESSURE: 156 MMHG | DIASTOLIC BLOOD PRESSURE: 83 MMHG

## 2023-08-21 VITALS — OXYGEN SATURATION: 95 % | DIASTOLIC BLOOD PRESSURE: 65 MMHG | SYSTOLIC BLOOD PRESSURE: 148 MMHG | HEART RATE: 73 BPM

## 2023-08-21 VITALS
RESPIRATION RATE: 25 BRPM | DIASTOLIC BLOOD PRESSURE: 70 MMHG | SYSTOLIC BLOOD PRESSURE: 160 MMHG | HEART RATE: 76 BPM | OXYGEN SATURATION: 94 %

## 2023-08-21 VITALS — OXYGEN SATURATION: 92 % | DIASTOLIC BLOOD PRESSURE: 68 MMHG | SYSTOLIC BLOOD PRESSURE: 156 MMHG | HEART RATE: 84 BPM

## 2023-08-21 VITALS
OXYGEN SATURATION: 97 % | SYSTOLIC BLOOD PRESSURE: 156 MMHG | DIASTOLIC BLOOD PRESSURE: 68 MMHG | RESPIRATION RATE: 18 BRPM | HEART RATE: 77 BPM | TEMPERATURE: 98.1 F

## 2023-08-21 VITALS — BODY MASS INDEX: 25 KG/M2 | BODY MASS INDEX: 27.9 KG/M2

## 2023-08-21 VITALS
SYSTOLIC BLOOD PRESSURE: 155 MMHG | HEART RATE: 72 BPM | OXYGEN SATURATION: 94 % | RESPIRATION RATE: 18 BRPM | DIASTOLIC BLOOD PRESSURE: 67 MMHG

## 2023-08-21 VITALS
RESPIRATION RATE: 24 BRPM | HEART RATE: 75 BPM | OXYGEN SATURATION: 94 % | SYSTOLIC BLOOD PRESSURE: 163 MMHG | DIASTOLIC BLOOD PRESSURE: 73 MMHG

## 2023-08-21 VITALS
HEART RATE: 66 BPM | OXYGEN SATURATION: 92 % | RESPIRATION RATE: 15 BRPM | SYSTOLIC BLOOD PRESSURE: 161 MMHG | DIASTOLIC BLOOD PRESSURE: 71 MMHG

## 2023-08-21 VITALS — HEART RATE: 85 BPM | OXYGEN SATURATION: 93 %

## 2023-08-21 DIAGNOSIS — Z79.01: ICD-10-CM

## 2023-08-21 DIAGNOSIS — J90: Primary | ICD-10-CM

## 2023-08-21 LAB
ADD MANUAL DIFF / SLIDE REVIEW: (no result)
ALBUMIN SERPL-MCNC: 2.4 G/DL (ref 3.5–5)
ALBUMIN/GLOB SERPL: 0.8 {RATIO} (ref 1–2.8)
ALP SERPL-CCNC: 150 U/L (ref 38–126)
ALT SERPL-CCNC: 39 IU/L (ref ?–50)
BODY FLUID CLOTTED?: (no result)
BODY FLUID TOT NUCLEATED CELLS: 225 /UL
BUN SERPL-MCNC: 38 MG/DL (ref 9–20)
CALCIUM SERPL-MCNC: 8.1 MG/DL (ref 8.4–10.2)
CHLORIDE SERPL-SCNC: 104 MMOL/L (ref 98–107)
CO2 SERPL-SCNC: 18 MMOL/L (ref 22–32)
EOSINOPHILS BODY FLUID: 1 %
ESTIMATED GLOMERULAR FILT RATE: 43 ML/MIN (ref 60–?)
GLOBULIN SER CALC-MCNC: 3.1 G/DL (ref 1.7–4.1)
GLUCOSE SERPL-MCNC: 174 MG/DL (ref 80–110)
HEMATOCRIT: 39.5 % (ref 41–53)
HEMOGLOBIN: 13.9 G/DL (ref 13.5–17.5)
HEMOLYSIS: 17 (ref 0–50)
INR PPP: 1.5 (ref 0.9–1.3)
LYMPHOCYTES # SPEC AUTO: 600 /UL (ref 1100–4500)
MCV RBC: 103.2 FL (ref 80–100)
MEAN CORPUSCULAR HEMOGLOBIN: 36.3 PG (ref 26–34)
MEAN CORPUSCULAR HGB CONC: 35.1 % (ref 30–36)
MONONUCLEAR WBC BODY FLUID: 95 %
PLATELET COUNT: 46 X10^3/UL (ref 150–400)
POLYNUCLEAR WBC BODY FLUID: 4 %
POTASSIUM SERPL-SCNC: 4.5 MMOL/L (ref 3.4–5.1)
PROT SERPL-MCNC: 5.5 G/DL (ref 6.3–8.2)
PROTHROMBIN TIME: 17.1 SECONDS (ref 10.1–12.7)
PTT PARTIAL THROMBOPLASTIN TIM: 31 SECONDS (ref 26–36)
SODIUM SERPL-SCNC: 128 MMOL/L (ref 137–145)

## 2023-08-21 PROCEDURE — 74177 CT ABD & PELVIS W/CONTRAST: CPT

## 2023-08-21 PROCEDURE — 32555 ASPIRATE PLEURA W/ IMAGING: CPT

## 2023-08-21 PROCEDURE — 71260 CT THORAX DX C+: CPT

## 2023-08-21 PROCEDURE — 86900 BLOOD TYPING SEROLOGIC ABO: CPT

## 2023-08-21 PROCEDURE — 85025 COMPLETE CBC W/AUTO DIFF WBC: CPT

## 2023-08-21 PROCEDURE — 85610 PROTHROMBIN TIME: CPT

## 2023-08-21 PROCEDURE — 89051 BODY FLUID CELL COUNT: CPT

## 2023-08-21 PROCEDURE — 87075 CULTR BACTERIA EXCEPT BLOOD: CPT

## 2023-08-21 PROCEDURE — 80053 COMPREHEN METABOLIC PANEL: CPT

## 2023-08-21 PROCEDURE — 87205 SMEAR GRAM STAIN: CPT

## 2023-08-21 PROCEDURE — 87070 CULTURE OTHR SPECIMN AEROBIC: CPT

## 2023-08-21 PROCEDURE — 81003 URINALYSIS AUTO W/O SCOPE: CPT

## 2023-08-21 PROCEDURE — 86901 BLOOD TYPING SEROLOGIC RH(D): CPT

## 2023-08-21 PROCEDURE — 83615 LACTATE (LD) (LDH) ENZYME: CPT

## 2023-08-21 PROCEDURE — 71045 X-RAY EXAM CHEST 1 VIEW: CPT

## 2023-08-21 PROCEDURE — 36415 COLL VENOUS BLD VENIPUNCTURE: CPT

## 2023-08-21 PROCEDURE — 99283 EMERGENCY DEPT VISIT LOW MDM: CPT

## 2023-08-21 PROCEDURE — 99284 EMERGENCY DEPT VISIT MOD MDM: CPT

## 2023-08-21 PROCEDURE — 85730 THROMBOPLASTIN TIME PARTIAL: CPT

## 2023-09-01 ENCOUNTER — HOSPITAL ENCOUNTER (OUTPATIENT)
Age: 78
End: 2023-09-01
Payer: MEDICARE

## 2023-09-01 VITALS — BODY MASS INDEX: 25 KG/M2

## 2023-09-01 DIAGNOSIS — J90: Primary | ICD-10-CM

## 2023-09-01 PROCEDURE — 32555 ASPIRATE PLEURA W/ IMAGING: CPT

## 2023-09-01 PROCEDURE — 71045 X-RAY EXAM CHEST 1 VIEW: CPT

## 2023-09-09 ENCOUNTER — HOSPITAL ENCOUNTER (EMERGENCY)
Age: 78
Discharge: HOME | End: 2023-09-09
Payer: MEDICARE

## 2023-09-09 VITALS — SYSTOLIC BLOOD PRESSURE: 120 MMHG | OXYGEN SATURATION: 93 % | DIASTOLIC BLOOD PRESSURE: 56 MMHG | HEART RATE: 77 BPM

## 2023-09-09 VITALS
OXYGEN SATURATION: 93 % | TEMPERATURE: 97.8 F | RESPIRATION RATE: 22 BRPM | SYSTOLIC BLOOD PRESSURE: 141 MMHG | HEART RATE: 87 BPM | DIASTOLIC BLOOD PRESSURE: 65 MMHG

## 2023-09-09 VITALS
DIASTOLIC BLOOD PRESSURE: 60 MMHG | OXYGEN SATURATION: 93 % | SYSTOLIC BLOOD PRESSURE: 133 MMHG | RESPIRATION RATE: 25 BRPM | HEART RATE: 76 BPM

## 2023-09-09 VITALS — OXYGEN SATURATION: 92 % | SYSTOLIC BLOOD PRESSURE: 117 MMHG | DIASTOLIC BLOOD PRESSURE: 55 MMHG | HEART RATE: 77 BPM

## 2023-09-09 VITALS — SYSTOLIC BLOOD PRESSURE: 131 MMHG | DIASTOLIC BLOOD PRESSURE: 60 MMHG | OXYGEN SATURATION: 91 % | HEART RATE: 75 BPM

## 2023-09-09 VITALS — BODY MASS INDEX: 25 KG/M2 | BODY MASS INDEX: 28.7 KG/M2

## 2023-09-09 DIAGNOSIS — C22.0: Primary | ICD-10-CM

## 2023-09-09 DIAGNOSIS — J90: ICD-10-CM

## 2023-09-09 DIAGNOSIS — R06.02: ICD-10-CM

## 2023-09-09 DIAGNOSIS — Z79.899: ICD-10-CM

## 2023-09-09 LAB
ADD MANUAL DIFF / SLIDE REVIEW: (no result)
ALBUMIN SERPL-MCNC: 2.1 G/DL (ref 3.5–5)
ALBUMIN/GLOB SERPL: 0.7 {RATIO} (ref 1–2.8)
ALP SERPL-CCNC: 123 U/L (ref 38–126)
ALT SERPL-CCNC: 38 IU/L (ref ?–50)
ANISOCYTOSIS BLD QL: (no result)
BUN SERPL-MCNC: 57 MG/DL (ref 9–20)
CALCIUM SERPL-MCNC: 7.6 MG/DL (ref 8.4–10.2)
CHLORIDE SERPL-SCNC: 105 MMOL/L (ref 98–107)
CO2 SERPL-SCNC: 15 MMOL/L (ref 22–32)
ESTIMATED GLOMERULAR FILT RATE: 30 ML/MIN (ref 60–?)
GLOBULIN SER CALC-MCNC: 2.9 G/DL (ref 1.7–4.1)
GLUCOSE SERPL-MCNC: 142 MG/DL (ref 80–110)
HEMATOCRIT: 35 % (ref 41–53)
HEMOGLOBIN: 12.4 G/DL (ref 13.5–17.5)
HEMOLYSIS: < 15 (ref 0–50)
INR PPP: 1.7 (ref 0.9–1.3)
LIPASE SERPL-CCNC: 335 U/L (ref 23–300)
LYMPHOCYTES # SPEC AUTO: 600 /UL (ref 1100–4500)
MACROCYTES BLD QL: (no result)
MCV RBC: 105.7 FL (ref 80–100)
MEAN CORPUSCULAR HEMOGLOBIN: 37.5 PG (ref 26–34)
MEAN CORPUSCULAR HGB CONC: 35.5 % (ref 30–36)
PLATELET COUNT: 45 X10^3/UL (ref 150–400)
POTASSIUM SERPL-SCNC: 4.7 MMOL/L (ref 3.4–5.1)
PROT SERPL-MCNC: 5 G/DL (ref 6.3–8.2)
PROTHROMBIN TIME: 19.3 SECONDS (ref 10.1–12.7)
PTT PARTIAL THROMBOPLASTIN TIM: 31 SECONDS (ref 26–36)
SODIUM SERPL-SCNC: 127 MMOL/L (ref 137–145)

## 2023-09-09 PROCEDURE — 85025 COMPLETE CBC W/AUTO DIFF WBC: CPT

## 2023-09-09 PROCEDURE — 36415 COLL VENOUS BLD VENIPUNCTURE: CPT

## 2023-09-09 PROCEDURE — 71045 X-RAY EXAM CHEST 1 VIEW: CPT

## 2023-09-09 PROCEDURE — 85730 THROMBOPLASTIN TIME PARTIAL: CPT

## 2023-09-09 PROCEDURE — 80053 COMPREHEN METABOLIC PANEL: CPT

## 2023-09-09 PROCEDURE — 83690 ASSAY OF LIPASE: CPT

## 2023-09-09 PROCEDURE — 99284 EMERGENCY DEPT VISIT MOD MDM: CPT

## 2023-09-09 PROCEDURE — 85610 PROTHROMBIN TIME: CPT

## 2023-09-11 ENCOUNTER — HOSPITAL ENCOUNTER (INPATIENT)
Dept: HOSPITAL 73 - ED | Age: 78
LOS: 3 days | Discharge: HOSPICE | DRG: 436 | End: 2023-09-14
Payer: MEDICARE

## 2023-09-11 VITALS
SYSTOLIC BLOOD PRESSURE: 154 MMHG | HEART RATE: 84 BPM | OXYGEN SATURATION: 94 % | RESPIRATION RATE: 21 BRPM | DIASTOLIC BLOOD PRESSURE: 67 MMHG

## 2023-09-11 VITALS
DIASTOLIC BLOOD PRESSURE: 59 MMHG | HEART RATE: 79 BPM | RESPIRATION RATE: 22 BRPM | SYSTOLIC BLOOD PRESSURE: 120 MMHG | OXYGEN SATURATION: 91 %

## 2023-09-11 VITALS
OXYGEN SATURATION: 90 % | RESPIRATION RATE: 26 BRPM | SYSTOLIC BLOOD PRESSURE: 146 MMHG | DIASTOLIC BLOOD PRESSURE: 65 MMHG | HEART RATE: 88 BPM | TEMPERATURE: 98.2 F

## 2023-09-11 VITALS
TEMPERATURE: 98.3 F | SYSTOLIC BLOOD PRESSURE: 140 MMHG | HEART RATE: 81 BPM | RESPIRATION RATE: 22 BRPM | OXYGEN SATURATION: 94 % | DIASTOLIC BLOOD PRESSURE: 51 MMHG

## 2023-09-11 VITALS
SYSTOLIC BLOOD PRESSURE: 144 MMHG | OXYGEN SATURATION: 94 % | DIASTOLIC BLOOD PRESSURE: 67 MMHG | HEART RATE: 79 BPM | RESPIRATION RATE: 18 BRPM

## 2023-09-11 VITALS — HEART RATE: 75 BPM | SYSTOLIC BLOOD PRESSURE: 147 MMHG | DIASTOLIC BLOOD PRESSURE: 65 MMHG | OXYGEN SATURATION: 95 %

## 2023-09-11 VITALS
DIASTOLIC BLOOD PRESSURE: 49 MMHG | HEART RATE: 80 BPM | OXYGEN SATURATION: 94 % | TEMPERATURE: 97.88 F | RESPIRATION RATE: 14 BRPM | SYSTOLIC BLOOD PRESSURE: 143 MMHG

## 2023-09-11 VITALS
HEART RATE: 74 BPM | DIASTOLIC BLOOD PRESSURE: 61 MMHG | RESPIRATION RATE: 16 BRPM | OXYGEN SATURATION: 92 % | SYSTOLIC BLOOD PRESSURE: 136 MMHG

## 2023-09-11 VITALS
SYSTOLIC BLOOD PRESSURE: 141 MMHG | DIASTOLIC BLOOD PRESSURE: 63 MMHG | RESPIRATION RATE: 24 BRPM | HEART RATE: 72 BPM | OXYGEN SATURATION: 94 %

## 2023-09-11 VITALS
RESPIRATION RATE: 27 BRPM | SYSTOLIC BLOOD PRESSURE: 120 MMHG | OXYGEN SATURATION: 91 % | HEART RATE: 84 BPM | DIASTOLIC BLOOD PRESSURE: 56 MMHG

## 2023-09-11 VITALS — RESPIRATION RATE: 18 BRPM | OXYGEN SATURATION: 93 % | HEART RATE: 86 BPM

## 2023-09-11 VITALS
RESPIRATION RATE: 36 BRPM | OXYGEN SATURATION: 94 % | SYSTOLIC BLOOD PRESSURE: 138 MMHG | DIASTOLIC BLOOD PRESSURE: 63 MMHG | HEART RATE: 73 BPM

## 2023-09-11 VITALS — BODY MASS INDEX: 31.8 KG/M2 | BODY MASS INDEX: 25 KG/M2

## 2023-09-11 VITALS
DIASTOLIC BLOOD PRESSURE: 58 MMHG | SYSTOLIC BLOOD PRESSURE: 120 MMHG | OXYGEN SATURATION: 91 % | RESPIRATION RATE: 36 BRPM | HEART RATE: 80 BPM

## 2023-09-11 VITALS
RESPIRATION RATE: 16 BRPM | HEART RATE: 79 BPM | DIASTOLIC BLOOD PRESSURE: 60 MMHG | OXYGEN SATURATION: 91 % | SYSTOLIC BLOOD PRESSURE: 125 MMHG

## 2023-09-11 VITALS
OXYGEN SATURATION: 95 % | HEART RATE: 78 BPM | RESPIRATION RATE: 17 BRPM | SYSTOLIC BLOOD PRESSURE: 154 MMHG | DIASTOLIC BLOOD PRESSURE: 67 MMHG

## 2023-09-11 DIAGNOSIS — K74.60: ICD-10-CM

## 2023-09-11 DIAGNOSIS — N40.0: ICD-10-CM

## 2023-09-11 DIAGNOSIS — R64: ICD-10-CM

## 2023-09-11 DIAGNOSIS — E11.9: ICD-10-CM

## 2023-09-11 DIAGNOSIS — C22.0: Primary | ICD-10-CM

## 2023-09-11 DIAGNOSIS — K75.81: ICD-10-CM

## 2023-09-11 DIAGNOSIS — Z85.038: ICD-10-CM

## 2023-09-11 DIAGNOSIS — J90: ICD-10-CM

## 2023-09-11 DIAGNOSIS — J94.8: ICD-10-CM

## 2023-09-11 DIAGNOSIS — Z51.5: ICD-10-CM

## 2023-09-11 DIAGNOSIS — I10: ICD-10-CM

## 2023-09-11 DIAGNOSIS — Z66: ICD-10-CM

## 2023-09-11 DIAGNOSIS — E87.1: ICD-10-CM

## 2023-09-11 DIAGNOSIS — Z79.899: ICD-10-CM

## 2023-09-11 DIAGNOSIS — K76.6: ICD-10-CM

## 2023-09-11 DIAGNOSIS — K72.10: ICD-10-CM

## 2023-09-11 DIAGNOSIS — R18.8: ICD-10-CM

## 2023-09-11 DIAGNOSIS — R06.02: ICD-10-CM

## 2023-09-11 LAB
ADD MANUAL DIFF / SLIDE REVIEW: NO
ALBUMIN SERPL-MCNC: 2.2 G/DL (ref 3.5–5)
ALBUMIN/GLOB SERPL: 0.8 {RATIO} (ref 1–2.8)
ALP SERPL-CCNC: 132 U/L (ref 38–126)
ALT SERPL-CCNC: 40 IU/L (ref ?–50)
BUN SERPL-MCNC: 67 MG/DL (ref 9–20)
CALCIUM SERPL-MCNC: 7.7 MG/DL (ref 8.4–10.2)
CHLORIDE SERPL-SCNC: 106 MMOL/L (ref 98–107)
CO2 SERPL-SCNC: 16 MMOL/L (ref 22–32)
ESTIMATED GLOMERULAR FILT RATE: 32 ML/MIN (ref 60–?)
FLUAV RNA UPPER RESP QL NAA+PROBE: (no result)
FLUBV RNA UPPER RESP QL NAA+PROBE: (no result)
GLOBULIN SER CALC-MCNC: 2.9 G/DL (ref 1.7–4.1)
GLUCOSE SERPL-MCNC: 171 MG/DL (ref 80–110)
HEMATOCRIT: 34.5 % (ref 41–53)
HEMOGLOBIN: 12.4 G/DL (ref 13.5–17.5)
HEMOLYSIS: 65 (ref 0–50)
INR PPP: 1.6 (ref 0.9–1.3)
LIPASE SERPL-CCNC: 331 U/L (ref 23–300)
LYMPHOCYTES # SPEC AUTO: 500 /UL (ref 1100–4500)
MCV RBC: 105.6 FL (ref 80–100)
MEAN CORPUSCULAR HEMOGLOBIN: 37.9 PG (ref 26–34)
MEAN CORPUSCULAR HGB CONC: 35.9 % (ref 30–36)
PLATELET COUNT: 51 X10^3/UL (ref 150–400)
POTASSIUM SERPL-SCNC: 5 MMOL/L (ref 3.4–5.1)
PROT SERPL-MCNC: 5.1 G/DL (ref 6.3–8.2)
PROTHROMBIN TIME: 18 SECONDS (ref 10.1–12.7)
PTT PARTIAL THROMBOPLASTIN TIM: 31 SECONDS (ref 26–36)
SODIUM SERPL-SCNC: 127 MMOL/L (ref 137–145)

## 2023-09-11 PROCEDURE — 32555 ASPIRATE PLEURA W/ IMAGING: CPT

## 2023-09-11 PROCEDURE — 96365 THER/PROPH/DIAG IV INF INIT: CPT

## 2023-09-11 PROCEDURE — 87040 BLOOD CULTURE FOR BACTERIA: CPT

## 2023-09-11 PROCEDURE — 86901 BLOOD TYPING SEROLOGIC RH(D): CPT

## 2023-09-11 PROCEDURE — 83690 ASSAY OF LIPASE: CPT

## 2023-09-11 PROCEDURE — 80053 COMPREHEN METABOLIC PANEL: CPT

## 2023-09-11 PROCEDURE — 99284 EMERGENCY DEPT VISIT MOD MDM: CPT

## 2023-09-11 PROCEDURE — 76705 ECHO EXAM OF ABDOMEN: CPT

## 2023-09-11 PROCEDURE — 71045 X-RAY EXAM CHEST 1 VIEW: CPT

## 2023-09-11 PROCEDURE — 86850 RBC ANTIBODY SCREEN: CPT

## 2023-09-11 PROCEDURE — 85730 THROMBOPLASTIN TIME PARTIAL: CPT

## 2023-09-11 PROCEDURE — 96375 TX/PRO/DX INJ NEW DRUG ADDON: CPT

## 2023-09-11 PROCEDURE — 0241U: CPT

## 2023-09-11 PROCEDURE — 32550 INSERT PLEURAL CATH: CPT

## 2023-09-11 PROCEDURE — 99233 SBSQ HOSP IP/OBS HIGH 50: CPT

## 2023-09-11 PROCEDURE — 85025 COMPLETE CBC W/AUTO DIFF WBC: CPT

## 2023-09-11 PROCEDURE — 36415 COLL VENOUS BLD VENIPUNCTURE: CPT

## 2023-09-11 PROCEDURE — 86900 BLOOD TYPING SEROLOGIC ABO: CPT

## 2023-09-11 PROCEDURE — 85610 PROTHROMBIN TIME: CPT

## 2023-09-11 RX ADMIN — Medication 650 MG: at 17:02

## 2023-09-11 RX ADMIN — FUROSEMIDE 40 MG: 10 INJECTION, SOLUTION INTRAMUSCULAR; INTRAVENOUS at 17:02

## 2023-09-11 RX ADMIN — ONDANSETRON 4 MG: 2 INJECTION INTRAMUSCULAR; INTRAVENOUS at 13:18

## 2023-09-11 RX ADMIN — CYCLOBENZAPRINE HYDROCHLORIDE 10 MG: 10 TABLET, FILM COATED ORAL at 17:03

## 2023-09-11 RX ADMIN — FUROSEMIDE 116 MG: 10 INJECTION, SOLUTION INTRAMUSCULAR; INTRAVENOUS at 12:59

## 2023-09-11 RX ADMIN — OXYCODONE HYDROCHLORIDE 5 MG: 5 TABLET ORAL at 19:25

## 2023-09-12 VITALS
RESPIRATION RATE: 23 BRPM | HEART RATE: 70 BPM | DIASTOLIC BLOOD PRESSURE: 40 MMHG | SYSTOLIC BLOOD PRESSURE: 142 MMHG | OXYGEN SATURATION: 93 % | TEMPERATURE: 98.96 F

## 2023-09-12 VITALS
TEMPERATURE: 98.6 F | HEART RATE: 77 BPM | RESPIRATION RATE: 19 BRPM | DIASTOLIC BLOOD PRESSURE: 39 MMHG | OXYGEN SATURATION: 93 % | SYSTOLIC BLOOD PRESSURE: 155 MMHG

## 2023-09-12 RX ADMIN — TAMSULOSIN HYDROCHLORIDE 0.4 MG: 0.4 CAPSULE ORAL at 15:16

## 2023-09-12 RX ADMIN — CEFAZOLIN SODIUM 200 GM: 2 INJECTION, SOLUTION INTRAVENOUS at 15:09

## 2023-09-12 RX ADMIN — FUROSEMIDE 40 MG: 10 INJECTION, SOLUTION INTRAMUSCULAR; INTRAVENOUS at 09:49

## 2023-09-12 RX ADMIN — OXYCODONE HYDROCHLORIDE 5 MG: 5 TABLET ORAL at 18:24

## 2023-09-12 RX ADMIN — TAMSULOSIN HYDROCHLORIDE 0.4 MG: 0.4 CAPSULE ORAL at 21:17

## 2023-09-12 RX ADMIN — Medication 10 ML: at 18:25

## 2023-09-12 RX ADMIN — Medication 10 ML: at 21:18

## 2023-09-12 RX ADMIN — FUROSEMIDE 40 MG: 10 INJECTION, SOLUTION INTRAMUSCULAR; INTRAVENOUS at 18:25

## 2023-09-13 VITALS
RESPIRATION RATE: 18 BRPM | TEMPERATURE: 97.52 F | OXYGEN SATURATION: 91 % | DIASTOLIC BLOOD PRESSURE: 42 MMHG | SYSTOLIC BLOOD PRESSURE: 110 MMHG | HEART RATE: 67 BPM

## 2023-09-13 RX ADMIN — Medication 10 ML: at 08:28

## 2023-09-13 RX ADMIN — FUROSEMIDE 40 MG: 10 INJECTION, SOLUTION INTRAMUSCULAR; INTRAVENOUS at 08:23

## 2023-09-13 RX ADMIN — MELATONIN TAB 3 MG 6 MG: 3 TAB at 21:59

## 2023-09-13 RX ADMIN — CYCLOBENZAPRINE HYDROCHLORIDE 10 MG: 10 TABLET, FILM COATED ORAL at 21:59

## 2023-09-13 RX ADMIN — PANTOPRAZOLE SODIUM 40 MG: 20 TABLET, DELAYED RELEASE ORAL at 07:02

## 2023-09-13 RX ADMIN — OXYCODONE HYDROCHLORIDE 5 MG: 5 TABLET ORAL at 11:42

## 2023-09-13 RX ADMIN — OXYCODONE HYDROCHLORIDE 5 MG: 5 TABLET ORAL at 21:59

## 2023-09-13 RX ADMIN — TAMSULOSIN HYDROCHLORIDE 0.4 MG: 0.4 CAPSULE ORAL at 21:59

## 2023-09-13 RX ADMIN — INSULIN GLARGINE 25 UNIT: 100 INJECTION, SOLUTION SUBCUTANEOUS at 08:56

## 2023-09-13 RX ADMIN — CYCLOBENZAPRINE HYDROCHLORIDE 10 MG: 10 TABLET, FILM COATED ORAL at 11:42

## 2023-09-13 RX ADMIN — FUROSEMIDE 40 MG: 10 INJECTION, SOLUTION INTRAMUSCULAR; INTRAVENOUS at 16:45

## 2023-09-13 RX ADMIN — SPIRONOLACTONE 100 MG: 25 TABLET, FILM COATED ORAL at 08:23

## 2023-09-13 RX ADMIN — Medication 650 MG: at 00:37

## 2023-09-13 RX ADMIN — LACTULOSE 20 GM: 20 SOLUTION ORAL at 08:23

## 2023-09-13 RX ADMIN — TAMSULOSIN HYDROCHLORIDE 0.4 MG: 0.4 CAPSULE ORAL at 08:23

## 2023-09-14 VITALS
SYSTOLIC BLOOD PRESSURE: 131 MMHG | DIASTOLIC BLOOD PRESSURE: 40 MMHG | TEMPERATURE: 97.5 F | HEART RATE: 73 BPM | OXYGEN SATURATION: 92 % | RESPIRATION RATE: 20 BRPM

## 2023-09-14 RX ADMIN — Medication 650 MG: at 06:25

## 2023-09-14 RX ADMIN — GUAIFENESIN AND CODEINE PHOSPHATE 5 ML: 100; 10 SOLUTION ORAL at 02:22

## 2023-09-14 RX ADMIN — TAMSULOSIN HYDROCHLORIDE 0.4 MG: 0.4 CAPSULE ORAL at 08:18

## 2023-09-14 RX ADMIN — GUAIFENESIN AND CODEINE PHOSPHATE 5 ML: 100; 10 SOLUTION ORAL at 08:18

## 2023-09-14 RX ADMIN — OXYCODONE HYDROCHLORIDE 5 MG: 5 TABLET ORAL at 06:25

## 2023-09-14 RX ADMIN — PANTOPRAZOLE SODIUM 40 MG: 20 TABLET, DELAYED RELEASE ORAL at 06:20

## 2023-09-14 RX ADMIN — CYCLOBENZAPRINE HYDROCHLORIDE 10 MG: 10 TABLET, FILM COATED ORAL at 06:26

## 2023-09-14 RX ADMIN — LACTULOSE 20 GM: 20 SOLUTION ORAL at 08:18

## 2023-09-14 RX ADMIN — SPIRONOLACTONE 100 MG: 25 TABLET, FILM COATED ORAL at 08:18
